# Patient Record
Sex: FEMALE | Race: WHITE | NOT HISPANIC OR LATINO | Employment: UNEMPLOYED | URBAN - METROPOLITAN AREA
[De-identification: names, ages, dates, MRNs, and addresses within clinical notes are randomized per-mention and may not be internally consistent; named-entity substitution may affect disease eponyms.]

---

## 2017-09-02 ENCOUNTER — OFFICE VISIT (OUTPATIENT)
Dept: URGENT CARE | Facility: CLINIC | Age: 32
End: 2017-09-02

## 2017-09-02 VITALS
HEIGHT: 62 IN | RESPIRATION RATE: 16 BRPM | SYSTOLIC BLOOD PRESSURE: 114 MMHG | WEIGHT: 114 LBS | BODY MASS INDEX: 20.98 KG/M2 | DIASTOLIC BLOOD PRESSURE: 75 MMHG | TEMPERATURE: 98 F | HEART RATE: 77 BPM | OXYGEN SATURATION: 100 %

## 2017-09-02 DIAGNOSIS — K20.90 ESOPHAGITIS: Primary | ICD-10-CM

## 2017-09-02 DIAGNOSIS — R07.9 CHEST PAIN, UNSPECIFIED TYPE: ICD-10-CM

## 2017-09-02 PROCEDURE — 3008F BODY MASS INDEX DOCD: CPT | Mod: S$GLB,,, | Performed by: EMERGENCY MEDICINE

## 2017-09-02 PROCEDURE — 99203 OFFICE O/P NEW LOW 30 MIN: CPT | Mod: S$GLB,,, | Performed by: EMERGENCY MEDICINE

## 2017-09-02 RX ORDER — SUCRALFATE 1 G/1
1 TABLET ORAL 4 TIMES DAILY
Qty: 40 TABLET | Refills: 0 | Status: SHIPPED | OUTPATIENT
Start: 2017-09-02 | End: 2018-09-02

## 2017-09-02 NOTE — PATIENT INSTRUCTIONS
Esophagitis     With esophagitis, the lining of the esophagus is inflamed.   Do you often have burning pain in your chest? You may have esophagitis. This is when the lining of the esophagus becomes red and swollen (inflamed). The esophagus is the tube that connects your throat to your stomach. This sheet tells you more about esophagitis. It also explains your treatment options.  Main types of esophagitis  Reflux esophagitis. This is the more common type. It is caused by GERD (gastroesophageal reflux disease). Stomach contents with stomach acid flow back up into the esophagus. This happens over and over. It leads to inflammation. Risk factors can include:  · Being overweight  · Asthma  · Smoking  · Pregnancy  · Frequent vomiting  · Certain medicines (such as aspirin and other anti-inflammatories)  · Hiatal hernia  Infectious esophagitis. This is caused by an infection. You are more at risk for this if you have a weakened immune system and poor nutrition. Antibiotic use can also be a factor. The infection is often due to the following:  · A type of fungus (typically candida)  · A virus, such as herpes simplex virus 1 (HSV-1) or cytomegalovirus (CMV)  Eosinophilic esophagitis. Foods or other things around you can give you an allergic reaction. This triggers an immune response and leads to esophagitis.  Pill-induced esophagitis. Certain types of medicines can cause inflammation and ulcers in the esophagus. These include doxycycline, aspirin, NSAIDs, alendronate, potassium, quinidine, iron.  Symptoms of esophagitis  The following symptoms can occur with esophagitis:  · Pain when swallowing, or trouble swallowing  · Pain behind your breastbone (heartburn)  · Acid regurgitation  · Chronic sore throat  · Gum Inflammation  · Cavities  · Bad breath  · Nausea  · Pain in your upper belly (abdomen)  · Bleeding (indicated by bright red vomit or black, tarry stool)  These symptoms occur more often with reflux  esophagitis:  · Coughing, wheezing, or asthma  · Hoarseness  Diagnosis of esophagitis  Your healthcare provider will ask about your health history and symptoms. Youll also be examined. Sometimes certain tests are needed. These may include:  · Upper endoscopy. A thin, flexible tube with a tiny light and camera is used. It is inserted through the mouth down into the esophagus. This lets the provider look for damage. A small sample of tissue (biopsy) may also be removed. The sample is sent to a lab for testing.  · Upper GI X-ray with barium. An X-ray is done after you drink a substance called barium. Barium may make problems in the esophagus easier to see on an x-ray.  · Esophageal pH. A soft, thin tube is passed into the esophagus through the nose or mouth for 24 hours. It measures the acid level in the esophagus.  · Esophageal manometry. A soft, thin tube is passed into the esophagus through the nose or mouth. It measures muscle contractions in the esophagus.  Treatment of esophagitis  Medicines. Different medicines can help treat esophagitis. The medicine used will depend on the type of esophagitis you have. Talk with your healthcare provider.  Lifestyle changes. Making the following changes can help reduce irritation and ease your symptoms:  · Avoid spicy foods (pepper, chili powder, de leon). Also avoid hard foods (nuts, crackers, raw vegetables) and acidic foods and drinks (tomatoes, citrus fruits and juices). Other problem foods include chocolate, peppermint, nutmeg, and foods high in fat.  · Until you can swallow without pain, follow a combined liquid and soft diet. Try foods such as cooked cereals, mashed potatoes, and soups.  · Take small bites and chew your food thoroughly.  · Avoid large meals and heavy evening meals. Don't lie down within 2 to 3 hours of eating.  · Get to or stay at a healthy weight.  · Avoid alcohol, caffeine, and smoking or tobacco products.  · Brush and floss your teeth  · Raise your  upper body by 4 to 6 inches when lying in bed. This can be done using a foam wedge. Or put blocks under the legs at the head of your bed.  Surgery. This may be needed for severe reflux esophagitis. Other noninvasive procedures to treat GERD and esophagitis are being studied. Your provider can tell you more.  Why treatment Is important  Without treatment, esophagitis can get worse. This is especially true with severe reflux esophagitis. For instance, continued symptoms can cause scarring of the esophagus. Over time, this can cause a narrowing the esophagus (stricture). This can make it hard to pass food down to the stomach. As symptoms go on they can also cause changes in the lining of the esophagus. These changes can put you at a slightly higher risk of cancer of the esophagus.   Date Last Reviewed: 7/1/2016 © 2000-2016 [employer]. [address] [place] [id number]. All rights reserved. This information is not intended as a substitute for professional medical care. Always follow your healthcare professional's instructions.        Uncertain Causes of Chest Pain    Chest pain can happen for a number of reasons. Sometimes the cause can't be determined. If your condition does not seem serious, and your pain does not appear to be coming from your heart, your healthcare provider may recommend watching it closely. Sometimes the signs of a serious problem take more time to appear. Many problems not related to your heart can cause chest pain.These include:  · Musculoskeletal. Costochondritis, an inflammation of the tissues around the ribs that can occur from trauma or overuse injuries  · Respiratory. Pneumonia, pneumothorax, or pneumonitis (inflammation of the lining of the chest and lungs)  · Gastrointestinal. Esophageal reflux, heartburn, or gallbladder disease  · Anxiety and panic disorders  · Nerve compression and neuritis  · Miscellaneous problems such as aortic aneurysm or pulmonary embolism (a  blood clot in the lungs)  Home care  After your visit, follow these recommendations:  · Rest today and avoid strenuous activity.  · Take any prescribed medicine as directed.  · Be aware of any recurrent chest pain and notice any changes  Follow-up care  Follow up with your healthcare provider if you do not start to feel better within 24 hours, or as advised.  Call 911  Call 911 if any of these occur:  · A change in the type of pain: if it feels different, becomes more severe, lasts longer, or begins to spread into your shoulder, arm, neck, jaw or back  · Shortness of breath or increased pain with breathing  · Weakness, dizziness, or fainting  · Rapid heart beat  · Crushing sensation in your chest  When to seek medical advice  Call your healthcare provider right away if any of the following occur:  · Cough with dark colored sputum (phlegm) or blood  · Fever of 100.4ºF (38ºC) or higher, or as directed by your healthcare provider  · Swelling, pain or redness in one leg  · Shortness of breath  Date Last Reviewed: 12/30/2015 © 2000-2016 [employer]. [address] [place] [id number]. All rights reserved. This information is not intended as a substitute for professional medical care. Always follow your healthcare professional's instructions.

## 2017-09-02 NOTE — PROGRESS NOTES
Subjective:       Patient ID: Nakia Jaime is a 32 y.o. female.    Chief Complaint: Abdominal Pain    Pt states epigastric pain x 6 days after taking Aleve without water. She felt the aleve got stuck when she took it without water. Then she drank 2 glasses of water and it went down some but still has irritated feeling after all this times. No choking. No SOB No vomiting She is able to eat and drink. No hx GERD      Abdominal Pain   This is a new problem. The current episode started in the past 7 days. The onset quality is sudden. The problem has been unchanged. The pain is located in the epigastric region. The pain is at a severity of 6/10. The pain is moderate. The quality of the pain is aching. The abdominal pain radiates to the left shoulder. Pertinent negatives include no constipation, diarrhea, dysuria, fever, hematochezia, melena, nausea or vomiting. Nothing aggravates the pain. The pain is relieved by nothing.     Review of Systems   Constitution: Negative for chills and fever.   Cardiovascular: Negative for chest pain.   Respiratory: Negative for shortness of breath.    Musculoskeletal: Negative for back pain.   Gastrointestinal: Negative for abdominal pain, constipation, diarrhea, hematochezia, melena, nausea and vomiting.   Genitourinary: Negative for dysuria.       Objective:      Physical Exam   Constitutional: She is oriented to person, place, and time. She appears well-developed and well-nourished. She appears distressed (mild discomfort).   HENT:   Head: Normocephalic and atraumatic.   Right Ear: External ear normal.   Left Ear: External ear normal.   Nose: Nose normal.   Mouth/Throat: Oropharynx is clear and moist.   Eyes: Conjunctivae and EOM are normal. Pupils are equal, round, and reactive to light.   Neck: Normal range of motion. Neck supple.   Cardiovascular: Normal rate, regular rhythm and normal heart sounds.    Pulmonary/Chest: Effort normal and breath sounds normal.   Abdominal: Soft.  Bowel sounds are normal.   Musculoskeletal: Normal range of motion.   Neurological: She is alert and oriented to person, place, and time.   Skin: Skin is warm and dry.     CXR neg radiologist confirm  Assessment:       1. Esophagitis    2. Chest pain, unspecified type        Plan:       Nakia was seen today for abdominal pain.    Diagnoses and all orders for this visit:    Esophagitis  -     Ambulatory referral to Internal Medicine    Chest pain, unspecified type  -     X-Ray Chest PA And Lateral; Future    Other orders  -     sucralfate (CARAFATE) 1 gram tablet; Take 1 tablet (1 g total) by mouth 4 (four) times daily.

## 2018-04-04 ENCOUNTER — OFFICE VISIT (OUTPATIENT)
Dept: URGENT CARE | Facility: CLINIC | Age: 33
End: 2018-04-04

## 2018-04-04 VITALS
DIASTOLIC BLOOD PRESSURE: 48 MMHG | TEMPERATURE: 99 F | WEIGHT: 109 LBS | BODY MASS INDEX: 20.06 KG/M2 | OXYGEN SATURATION: 100 % | HEART RATE: 87 BPM | HEIGHT: 62 IN | SYSTOLIC BLOOD PRESSURE: 109 MMHG | RESPIRATION RATE: 20 BRPM

## 2018-04-04 DIAGNOSIS — R53.83 FATIGUE, UNSPECIFIED TYPE: ICD-10-CM

## 2018-04-04 DIAGNOSIS — J02.9 ACUTE PHARYNGITIS, UNSPECIFIED ETIOLOGY: Primary | ICD-10-CM

## 2018-04-04 LAB
CTP QC/QA: YES
S PYO RRNA THROAT QL PROBE: NEGATIVE

## 2018-04-04 PROCEDURE — 99214 OFFICE O/P EST MOD 30 MIN: CPT | Mod: S$GLB,,, | Performed by: NURSE PRACTITIONER

## 2018-04-04 PROCEDURE — 87880 STREP A ASSAY W/OPTIC: CPT | Mod: QW,S$GLB,, | Performed by: NURSE PRACTITIONER

## 2018-04-04 NOTE — PROGRESS NOTES
"Subjective:       Patient ID: Nakia Jaime is a 32 y.o. female.    Vitals:  height is 5' 2" (1.575 m) and weight is 49.4 kg (109 lb). Her oral temperature is 98.5 °F (36.9 °C). Her blood pressure is 109/48 (abnormal) and her pulse is 87. Her respiration is 20 and oxygen saturation is 100%.     Chief Complaint: Sore Throat and Headache    The patient complains of worsening sore throat and fatigue since Monday.  Pts sig other dx with Step and started on antibiotics today.  The patient also complains of pain with swallowing and "my throat feeling scratchy." Denies N/V.  Pt is able to tolerate PO fluids.  Pt also noticed "white spots" on throat today.  Pt resp e/u. NAD     Per Dr. Maldonado. Pt known to me We discussed the plan Her partner did test positive for strept She tested neg. She is self pay but would prefer the culture and no antibiotics unless she needs it. She will be given pain meds. She really needs note for work and wanted to know if she was contagious    Throat is erythematous but no soft palate swelling and small tender anterior nodes      Sore Throat    This is a new problem. The current episode started in the past 7 days. The problem has been gradually worsening. The pain is worse on the left side. There has been no fever. The pain is at a severity of 7/10. The pain is moderate. Associated symptoms include coughing, headaches, neck pain, swollen glands and trouble swallowing. Pertinent negatives include no abdominal pain, congestion, ear pain, hoarse voice or shortness of breath. She has had exposure to strep. She has tried NSAIDs for the symptoms. The treatment provided mild relief.   Headache    This is a new problem. The current episode started yesterday. The problem occurs constantly. The problem has been unchanged. The pain is located in the temporal region. The pain does not radiate. The pain quality is similar to prior headaches. The quality of the pain is described as dull. The pain is at a " "severity of 4/10. Associated symptoms include coughing, neck pain, sinus pressure, a sore throat ("white spots") and swollen glands. Pertinent negatives include no abdominal pain, ear pain, eye redness, fever or nausea. Nothing aggravates the symptoms. She has tried nothing for the symptoms. The treatment provided no relief.     Review of Systems   Constitution: Negative for chills, fever and malaise/fatigue.   HENT: Positive for odynophagia, sinus pressure, sore throat ("white spots") and trouble swallowing. Negative for congestion, ear pain and hoarse voice.    Eyes: Negative for discharge and redness.   Cardiovascular: Negative for chest pain, dyspnea on exertion and leg swelling.   Respiratory: Positive for cough. Negative for shortness of breath, sputum production and wheezing.    Musculoskeletal: Positive for neck pain. Negative for myalgias.   Gastrointestinal: Negative for abdominal pain and nausea.   Neurological: Positive for headaches.         Objective:      Physical Exam   Constitutional: She is oriented to person, place, and time. She appears well-developed and well-nourished. She is cooperative.  Non-toxic appearance. She does not appear ill. No distress.   HENT:   Head: Normocephalic and atraumatic.   Right Ear: Hearing, tympanic membrane and external ear normal. There is tenderness (wax noted ).   Left Ear: Hearing, tympanic membrane and external ear normal. There is tenderness (wax noted).   Nose: Nose normal. No mucosal edema, rhinorrhea or nasal deformity. No epistaxis. Right sinus exhibits no maxillary sinus tenderness and no frontal sinus tenderness. Left sinus exhibits no maxillary sinus tenderness and no frontal sinus tenderness.   Mouth/Throat: Uvula is midline and mucous membranes are normal. No trismus in the jaw. Normal dentition. No uvula swelling. Oropharyngeal exudate, posterior oropharyngeal edema and posterior oropharyngeal erythema present. Tonsils are 1+ on the right. Tonsils are " 2+ on the left.   Eyes: Conjunctivae, EOM and lids are normal. Pupils are equal, round, and reactive to light. Right eye exhibits no discharge. Left eye exhibits no discharge. No scleral icterus.   Sclera clear bilat   Neck: Trachea normal, normal range of motion, full passive range of motion without pain and phonation normal. Neck supple.   Cardiovascular: Normal rate, regular rhythm, normal heart sounds, intact distal pulses and normal pulses.    Pulmonary/Chest: Effort normal and breath sounds normal. No respiratory distress.   Abdominal: Soft. Normal appearance and bowel sounds are normal. She exhibits no distension, no pulsatile midline mass and no mass. There is no tenderness.   Musculoskeletal: Normal range of motion. She exhibits no edema or deformity.   Lymphadenopathy:     She has no cervical adenopathy.   Neurological: She is alert and oriented to person, place, and time. She exhibits normal muscle tone. Coordination normal.   Skin: Skin is warm, dry and intact. She is not diaphoretic. No pallor.   Psychiatric: She has a normal mood and affect. Her speech is normal and behavior is normal. Judgment and thought content normal. Cognition and memory are normal.   Nursing note and vitals reviewed.      Assessment:       1. Acute pharyngitis, unspecified etiology    2. Fatigue, unspecified type        Plan:     Strept test done and is negative  Strept culture ordered  Magic mouthwash prescribed                                                          Pharyngitis   If your condition worsens or fails to improve we recommend that you receive another evaluation at the ER immediately or contact your PCP to discuss your concerns or return here. You must understand that you've received an urgent care treatment only and that you may be released before all your medical problems are known or treated. You the patient will arrange for followup care as instructed.   The majority of all sore throats or tonsillitis are viral  and antibiotics will not treat this.   If the strept culture was done and returns negative in 3-5 days and you are still having a sore throat, you may need to get a mono spot test done or repeated.   Tylenol or ibuprofen for pain may help as long as you are not allergic to these meds or have a medical condition such as stomach ulcers, liver or kidney disease or taking blood thinners etc that would   prevent you from using these medications.   Rest and fluids will help as well.   If you were prescribed antibiotics and are female and on BCP use additional methods to prevent pregnancy while on the antibiotics and for one cycle after

## 2018-04-04 NOTE — LETTER
April 4, 2018      [facility]  [address] [facility] [address]  Phone: [phone]  Fax: [phone]       Patient: Nakia Jaime   YOB: 1985  Date of Visit: 04/04/2018    To Whom It May Concern:    Hannah Jaime  was at [facility] on 04/04/2018. She has a pharygitis and should not return to work until Friday 4/6/18 or no fever for 24 hours. If you have any questions or concerns, or if I can be of further assistance, please do not hesitate to contact me.    Sincerely,    Rafaela Maldonado MD

## 2018-04-06 ENCOUNTER — TELEPHONE (OUTPATIENT)
Dept: URGENT CARE | Facility: CLINIC | Age: 33
End: 2018-04-06

## 2018-04-06 LAB — S PYO THROAT QL CULT: POSITIVE

## 2018-04-06 RX ORDER — AMOXICILLIN 500 MG/1
500 TABLET, FILM COATED ORAL EVERY 12 HOURS
Qty: 20 TABLET | Refills: 0 | Status: SHIPPED | OUTPATIENT
Start: 2018-04-06 | End: 2018-04-16

## 2018-04-06 NOTE — TELEPHONE ENCOUNTER
PT is informed of the results of her throat culture. She understands that course of treatment.  Pharmacy updated and she is informed that she will need to be started on anbiotics.She has no complaints at this time and actually states she is feelingbetter

## 2018-04-06 NOTE — TELEPHONE ENCOUNTER
Attempted to contact pt.  Pt was a no answer.  Unable to leave message due to voicemail box being full.

## 2018-06-06 ENCOUNTER — OFFICE VISIT (OUTPATIENT)
Dept: URGENT CARE | Facility: CLINIC | Age: 33
End: 2018-06-06

## 2018-06-06 VITALS
HEIGHT: 62 IN | RESPIRATION RATE: 17 BRPM | TEMPERATURE: 99 F | DIASTOLIC BLOOD PRESSURE: 79 MMHG | WEIGHT: 116 LBS | BODY MASS INDEX: 21.35 KG/M2 | HEART RATE: 72 BPM | OXYGEN SATURATION: 100 % | SYSTOLIC BLOOD PRESSURE: 123 MMHG

## 2018-06-06 DIAGNOSIS — B86 SCABIES: Primary | ICD-10-CM

## 2018-06-06 PROCEDURE — 99214 OFFICE O/P EST MOD 30 MIN: CPT | Mod: S$GLB,,, | Performed by: PHYSICIAN ASSISTANT

## 2018-06-06 RX ORDER — PERMETHRIN 50 MG/G
CREAM TOPICAL ONCE
Qty: 60 G | Refills: 1 | Status: SHIPPED | OUTPATIENT
Start: 2018-06-06 | End: 2018-06-06

## 2018-06-06 NOTE — PROGRESS NOTES
"Subjective:       Patient ID: Nakia Jaime is a 33 y.o. female.    Vitals:  height is 5' 2" (1.575 m) and weight is 52.6 kg (116 lb). Her tympanic temperature is 98.6 °F (37 °C). Her blood pressure is 123/79 and her pulse is 72. Her respiration is 17 and oxygen saturation is 100%.     Chief Complaint: Scabies    Pt has red bumps on abdomen for 3 or 4 days. Pt had previous exposure to scabies. Pt treated herself with teatree oil.      Rash   This is a recurrent problem. The current episode started in the past 7 days. The problem has been gradually worsening since onset. The affected locations include the abdomen, left ankle and left buttock. The rash is characterized by itchiness and redness. She was exposed to nothing. Pertinent negatives include no fever, joint pain, shortness of breath or sore throat. Treatments tried: tea tree oil. The treatment provided no relief.     Review of Systems   Constitution: Negative for chills and fever.   HENT: Negative for sore throat.    Respiratory: Negative for shortness of breath.    Skin: Positive for itching and rash.   Musculoskeletal: Negative for joint pain.       Objective:      Physical Exam   Constitutional: She is oriented to person, place, and time. Vital signs are normal. She appears well-developed and well-nourished. She does not appear ill. No distress.   HENT:   Head: Normocephalic and atraumatic.   Right Ear: External ear normal.   Left Ear: External ear normal.   Nose: Nose normal.   Eyes: Conjunctivae, EOM and lids are normal. Right eye exhibits no discharge. Left eye exhibits no discharge.   Neck: Normal range of motion. Neck supple.   Cardiovascular: Normal rate, regular rhythm and normal heart sounds.  Exam reveals no gallop and no friction rub.    No murmur heard.  Pulmonary/Chest: Effort normal and breath sounds normal. No respiratory distress. She has no wheezes. She has no rales.   Musculoskeletal: Normal range of motion.   Neurological: She is alert " and oriented to person, place, and time.   Skin: Skin is warm and dry. Rash noted. She is not diaphoretic.        Multiple excoriated papules on lower abdomen and on left buttock; papules on buttock are in linear fashion; no visible drainage; no swelling; no surrounding erythema   Psychiatric: She has a normal mood and affect. Her behavior is normal.   Nursing note and vitals reviewed.      Assessment:       1. Scabies        Plan:       Patient states she has had scabies in the past as well. Discussed treatment method with patient. She expressed verbal understanding and agreement with treatment plan.    Scabies  -     permethrin (ELIMITE) 5 % cream; Apply topically once.  Dispense: 60 g; Refill: 1      Patient Instructions   Apply cream to entire body, leave overnight, and wash off in the morning. See attached handout for directions to rid your house of scabies.     Please follow up with your primary care provider within 2-5 days if your signs and symptoms have not resolved or worsen.     If your condition worsens or fails to improve we recommend that you receive another evaluation at the emergency room immediately or contact your primary medical clinic to discuss your concerns.   You must understand that you have received an Urgent Care treatment only and that you may be released before all of your medical problems are known or treated. You, the patient, will arrange for follow up care as instructed.         Scabies  Scabies is a skin infection. It is caused by a tiny parasitic insect, or mite, that is too small to see directly. It can be seen under a microscope, but it is usually recognized only by the rash and symptoms it causes. This can make it hard to diagnose since the signs and symptoms can be similar to other diseases.  The scabies mite tunnels under the skin. It creates a small burrow, where it leaves its eggs. These eggs jennings and grow into adults. They then create new burrows over the next 1 to 2 weeks.  The mites die in about 4 to 6 weeks. The rash and itching are caused by an allergic reaction to the scabies saliva or feces.  Scabies is highly contagious. It is spread by direct skin contact. It is easily spread by close personal contact, sexual contact, or by sharing bed linens or clothing used by an infected person.  It may take 4 to 6 weeks for symptoms to appear after being exposed. Everyone living in the house with you, as well as your sexual partners, should be treated at the same time. After the first treatment, you will no longer be contagious. You may return to work, school or .  Home care  · Machine wash in hot water all sheets, towels, pillowcases, underwear, pajamas, and any other clothing you have worn lately. Use the hot cycle of a dryer or use a hot iron to sterilize.  · Seal anything that is hard to wash in a plastic trash bag for 4 days. This includes coats, jackets, blankets, and bedspreads. (The insects die after 3 days off the human body.)  Medicines  Scabicides  Medicines used to treat scabies are called scabicides. These are creams that kill the scabies mites. A prescription is needed. When using these medicines:  · Always follow instructions provided by your healthcare provider and pharmacist. Also follow the printed instructions that come with the medicine.  · Talk with your provider about precautions to take when using these medicines.  · Use the cream on your body when your skin is cool and dry. Dont use it after a hot shower or bath.  · Usually the cream is put on your whole body. This means from your chin all the way down to your toes. Scabies does not usually affect an adults head. So cream is not needed there. For children, discuss this with your childs provider.  · Leave the cream or lotion on for the recommended amount of time. This is usually 8 to 12 hours.  · Dont leave cream or lotion on your skin longer than directed. Dont use more than recommended.  · Clean clothes  should be worn after the treatment.  · If you wash your hands after using the cream, you will need to reapply the cream to your hands.  · If you are breastfeeding, wash off your nipples before feeding. Then reapply the cream after breastfeeding.  · For babies or infants, put mittens on their hands. This will stop them from licking the cream or lotion. It will also stop them from scratching themselves because of the itching.  Other medicines  · An oral medicine called ivermectin may be prescribed for severe cases. It may also be used if you cant apply creams.  · Itching may cause the most discomfort. If large areas of your skin are affected, over-the-counter antihistamines may be used to reduce itching. Or you may be given a prescription antihistamine. Some of these medicines may make you sleepy. They are best used at bedtime. Antihistamines that dont make you sleepy can be used during the day. Note: Dont use medicine that has diphenhydramine if you have glaucoma, or if you are a man who has trouble urinating due to an enlarged prostate.  · If you were given antibiotics due to a bacterial infection, take them until they are finished. It is important to finish the antibiotics even if the wound looks better. This is to make sure the infection has cleared.  Follow-up care  Follow up with your healthcare provider, or as advised. Call your provider if your symptoms dont improve after 1 week, or if new burrows or rashes appear.  When to seek medical advice  Call your healthcare provider right away if any of these occur:  · Yellow-brown crusts or drainage from the sores  · Other signs of infection, including increasing redness, swelling, pain, or pus  · Fever of 100.4°F (38ºC) or higher, or as directed by your provider  Date Last Reviewed: 8/1/2016  © 7648-3900 [employer]. [address] [place] [address], [place] [address]. All rights reserved. This information is not intended as a substitute for professional  medical care. Always follow your healthcare professional's instructions.

## 2018-06-07 NOTE — PATIENT INSTRUCTIONS
Apply cream to entire body, leave overnight, and wash off in the morning. See attached handout for directions to rid your house of scabies.     Please follow up with your primary care provider within 2-5 days if your signs and symptoms have not resolved or worsen.     If your condition worsens or fails to improve we recommend that you receive another evaluation at the emergency room immediately or contact your primary medical clinic to discuss your concerns.   You must understand that you have received an Urgent Care treatment only and that you may be released before all of your medical problems are known or treated. You, the patient, will arrange for follow up care as instructed.         Scabies  Scabies is a skin infection. It is caused by a tiny parasitic insect, or mite, that is too small to see directly. It can be seen under a microscope, but it is usually recognized only by the rash and symptoms it causes. This can make it hard to diagnose since the signs and symptoms can be similar to other diseases.  The scabies mite tunnels under the skin. It creates a small burrow, where it leaves its eggs. These eggs jennings and grow into adults. They then create new burrows over the next 1 to 2 weeks. The mites die in about 4 to 6 weeks. The rash and itching are caused by an allergic reaction to the scabies saliva or feces.  Scabies is highly contagious. It is spread by direct skin contact. It is easily spread by close personal contact, sexual contact, or by sharing bed linens or clothing used by an infected person.  It may take 4 to 6 weeks for symptoms to appear after being exposed. Everyone living in the house with you, as well as your sexual partners, should be treated at the same time. After the first treatment, you will no longer be contagious. You may return to work, school or .  Home care  · Machine wash in hot water all sheets, towels, pillowcases, underwear, pajamas, and any other clothing you have worn  lately. Use the hot cycle of a dryer or use a hot iron to sterilize.  · Seal anything that is hard to wash in a plastic trash bag for 4 days. This includes coats, jackets, blankets, and bedspreads. (The insects die after 3 days off the human body.)  Medicines  Scabicides  Medicines used to treat scabies are called scabicides. These are creams that kill the scabies mites. A prescription is needed. When using these medicines:  · Always follow instructions provided by your healthcare provider and pharmacist. Also follow the printed instructions that come with the medicine.  · Talk with your provider about precautions to take when using these medicines.  · Use the cream on your body when your skin is cool and dry. Dont use it after a hot shower or bath.  · Usually the cream is put on your whole body. This means from your chin all the way down to your toes. Scabies does not usually affect an adults head. So cream is not needed there. For children, discuss this with your childs provider.  · Leave the cream or lotion on for the recommended amount of time. This is usually 8 to 12 hours.  · Dont leave cream or lotion on your skin longer than directed. Dont use more than recommended.  · Clean clothes should be worn after the treatment.  · If you wash your hands after using the cream, you will need to reapply the cream to your hands.  · If you are breastfeeding, wash off your nipples before feeding. Then reapply the cream after breastfeeding.  · For babies or infants, put mittens on their hands. This will stop them from licking the cream or lotion. It will also stop them from scratching themselves because of the itching.  Other medicines  · An oral medicine called ivermectin may be prescribed for severe cases. It may also be used if you cant apply creams.  · Itching may cause the most discomfort. If large areas of your skin are affected, over-the-counter antihistamines may be used to reduce itching. Or you may be given a  prescription antihistamine. Some of these medicines may make you sleepy. They are best used at bedtime. Antihistamines that dont make you sleepy can be used during the day. Note: Dont use medicine that has diphenhydramine if you have glaucoma, or if you are a man who has trouble urinating due to an enlarged prostate.  · If you were given antibiotics due to a bacterial infection, take them until they are finished. It is important to finish the antibiotics even if the wound looks better. This is to make sure the infection has cleared.  Follow-up care  Follow up with your healthcare provider, or as advised. Call your provider if your symptoms dont improve after 1 week, or if new burrows or rashes appear.  When to seek medical advice  Call your healthcare provider right away if any of these occur:  · Yellow-brown crusts or drainage from the sores  · Other signs of infection, including increasing redness, swelling, pain, or pus  · Fever of 100.4°F (38ºC) or higher, or as directed by your provider  Date Last Reviewed: 8/1/2016 © 2000-2017 [employer]. [address] [place] [address]. All rights reserved. This information is not intended as a substitute for professional medical care. Always follow your healthcare professional's instructions.

## 2020-03-18 ENCOUNTER — TELEPHONE (OUTPATIENT)
Dept: ADMINISTRATIVE | Facility: HOSPITAL | Age: 35
End: 2020-03-18

## 2020-03-18 NOTE — TELEPHONE ENCOUNTER
The patient was phoned about scheduling a virtual visit and inform them of the expectations. I left a voice message for the patient.    Chelsey FORD LPN  Clinical Care Coordinator  Internal Medicine  [facility]  [phone]

## 2020-03-19 ENCOUNTER — LAB VISIT (OUTPATIENT)
Dept: LAB | Facility: OTHER | Age: 35
End: 2020-03-19
Attending: FAMILY MEDICINE
Payer: COMMERCIAL

## 2020-03-19 ENCOUNTER — OFFICE VISIT (OUTPATIENT)
Dept: INTERNAL MEDICINE | Facility: CLINIC | Age: 35
End: 2020-03-19
Attending: FAMILY MEDICINE
Payer: COMMERCIAL

## 2020-03-19 ENCOUNTER — PATIENT MESSAGE (OUTPATIENT)
Dept: INTERNAL MEDICINE | Facility: CLINIC | Age: 35
End: 2020-03-19

## 2020-03-19 VITALS
HEIGHT: 62 IN | WEIGHT: 149.06 LBS | SYSTOLIC BLOOD PRESSURE: 102 MMHG | OXYGEN SATURATION: 99 % | BODY MASS INDEX: 27.43 KG/M2 | HEART RATE: 74 BPM | DIASTOLIC BLOOD PRESSURE: 70 MMHG

## 2020-03-19 DIAGNOSIS — M25.571 CHRONIC PAIN OF RIGHT ANKLE: ICD-10-CM

## 2020-03-19 DIAGNOSIS — Z00.00 PREVENTATIVE HEALTH CARE: ICD-10-CM

## 2020-03-19 DIAGNOSIS — Z00.00 PREVENTATIVE HEALTH CARE: Primary | ICD-10-CM

## 2020-03-19 DIAGNOSIS — L72.0 EPIDERMAL INCLUSION CYST: ICD-10-CM

## 2020-03-19 DIAGNOSIS — G89.29 CHRONIC PAIN OF RIGHT ANKLE: ICD-10-CM

## 2020-03-19 LAB
ALBUMIN SERPL BCP-MCNC: 4.5 G/DL (ref 3.5–5.2)
ALP SERPL-CCNC: 59 U/L (ref 55–135)
ALT SERPL W/O P-5'-P-CCNC: 18 U/L (ref 10–44)
ANION GAP SERPL CALC-SCNC: 5 MMOL/L (ref 8–16)
AST SERPL-CCNC: 20 U/L (ref 10–40)
BILIRUB SERPL-MCNC: 0.4 MG/DL (ref 0.1–1)
BUN SERPL-MCNC: 11 MG/DL (ref 6–20)
CALCIUM SERPL-MCNC: 9.5 MG/DL (ref 8.7–10.5)
CHLORIDE SERPL-SCNC: 106 MMOL/L (ref 95–110)
CHOLEST SERPL-MCNC: 141 MG/DL (ref 120–199)
CHOLEST/HDLC SERPL: 2 {RATIO} (ref 2–5)
CO2 SERPL-SCNC: 28 MMOL/L (ref 23–29)
CREAT SERPL-MCNC: 0.9 MG/DL (ref 0.5–1.4)
EST. GFR  (AFRICAN AMERICAN): >60 ML/MIN/1.73 M^2
EST. GFR  (NON AFRICAN AMERICAN): >60 ML/MIN/1.73 M^2
ESTIMATED AVG GLUCOSE: 97 MG/DL (ref 68–131)
GLUCOSE SERPL-MCNC: 83 MG/DL (ref 70–110)
HBA1C MFR BLD HPLC: 5 % (ref 4–5.6)
HDLC SERPL-MCNC: 72 MG/DL (ref 40–75)
HDLC SERPL: 51.1 % (ref 20–50)
LDLC SERPL CALC-MCNC: 59.8 MG/DL (ref 63–159)
NONHDLC SERPL-MCNC: 69 MG/DL
POTASSIUM SERPL-SCNC: 3.6 MMOL/L (ref 3.5–5.1)
PROT SERPL-MCNC: 7.4 G/DL (ref 6–8.4)
SODIUM SERPL-SCNC: 139 MMOL/L (ref 136–145)
TRIGL SERPL-MCNC: 46 MG/DL (ref 30–150)
TSH SERPL DL<=0.005 MIU/L-ACNC: 1.21 UIU/ML (ref 0.4–4)

## 2020-03-19 PROCEDURE — 99385 PR PREVENTIVE VISIT,NEW,18-39: ICD-10-PCS | Mod: S$GLB,,, | Performed by: FAMILY MEDICINE

## 2020-03-19 PROCEDURE — 99999 PR PBB SHADOW E&M-EST. PATIENT-LVL IV: CPT | Mod: PBBFAC,,, | Performed by: FAMILY MEDICINE

## 2020-03-19 PROCEDURE — 36415 COLL VENOUS BLD VENIPUNCTURE: CPT

## 2020-03-19 PROCEDURE — 84443 ASSAY THYROID STIM HORMONE: CPT

## 2020-03-19 PROCEDURE — 83036 HEMOGLOBIN GLYCOSYLATED A1C: CPT

## 2020-03-19 PROCEDURE — 99385 PREV VISIT NEW AGE 18-39: CPT | Mod: S$GLB,,, | Performed by: FAMILY MEDICINE

## 2020-03-19 PROCEDURE — 80061 LIPID PANEL: CPT

## 2020-03-19 PROCEDURE — 99999 PR PBB SHADOW E&M-EST. PATIENT-LVL IV: ICD-10-PCS | Mod: PBBFAC,,, | Performed by: FAMILY MEDICINE

## 2020-03-19 PROCEDURE — 80053 COMPREHEN METABOLIC PANEL: CPT

## 2020-03-19 NOTE — PROGRESS NOTES
CHIEF COMPLAINT: Establish a primary care physician    HISTORY OF PRESENT ILLNESS: The patient is a perfectly healthy 34-year-old white female.  The patient has 2 specific complaints today.  It has been a while since the patient has seen a primary care physician.  The patient wishes to establish a primary care physician.  The patient would also like to get some basic blood work done.    Midline T12 epidermal inclusion cyst has been present for quite a long time.  Intermitently drains.  She would like to have it excised.    Right ankle sprain due to jumping out of a pickup truck bed at work several months ago.  Giving up on WC as she cannot get the care and therapy that she feels she needs.  Would like to procede with her BC/BS for further care    REVIEW OF SYSTEMS:  GENERAL: No fever, chills, fatigability or weight loss.  SKIN: No rashes, itching or changes in color or texture of skin.  Except for the dermal cyst  HEAD: No headaches or recent head trauma.  EYES: Visual acuity fine. No photophobia, ocular pain or diplopia.  EARS: Denies ear pain, discharge or vertigo.  NOSE: No loss of smell, no epistaxis or postnasal drip.  MOUTH & THROAT: No hoarseness or change in voice. No excessive gum bleeding.  NODES: Denies swollen glands.  CHEST: Denies FIGUEROA, cyanosis, wheezing, cough and sputum production.  CARDIOVASCULAR: Denies chest pain, PND, orthopnea or reduced exercise tolerance.  ABDOMEN: Appetite fine. No weight loss. Denies diarrhea, abdominal pain, hematemesis or blood in stool.  URINARY: No flank pain, dysuria or hematuria.  PERIPHERAL VASCULAR: No claudication or cyanosis.  MUSCULOSKELETAL:  She has right ankle joint stiffness + swelling. Denies back pain.  NEUROLOGIC: No history of seizures, paralysis, alteration of gait or coordination.    SOCIAL HISTORY: The patient does not smoke.  The patient consumes alcohol socially.  The patient is single.  She works at [employer]    PHYSICAL EXAMINATION:   Blood  "pressure 102/70, pulse 74, height 5' 2" (1.575 m), weight 67.6 kg (149 lb 0.5 oz), SpO2 99 %.    APPEARANCE: Well nourished, well developed, in no acute distress.    HEAD: Normocephalic, atraumatic.  EYES: PERRL. EOMI.  Conjunctivae without injection and  anicteric  EARS: TM's intact. Light reflex normal. No retraction or perforation.    NOSE: Mucosa pink. Airway clear.  MOUTH & THROAT: No tonsillar enlargement. No pharyngeal erythema or exudate. No stridor.  NECK: Supple.   NODES: No cervical, axillary or inguinal lymph node enlargement.  CHEST: Lungs clear to auscultation.  No retractions are noted.  No rales or rhonchi are present.  CARDIOVASCULAR: Normal S1, S2. No rubs, murmurs or gallops.  ABDOMEN: Bowel sounds normal. Not distended. Soft. No tenderness or masses.  No ascites is noted.  MUSCULOSKELETAL:  There is no clubbing, cyanosis, or edema of the extremities x4.  There is full range of motion of the lumbar spine.  There is full range of motion of the extremities x4.  There is no deformity noted.    NEUROLOGIC:       Normal speech development.      Hearing normal.      Normal gait.      Motor and sensory exams grossly normal.  PSYCHIATRIC: Patient is alert and oriented x3.  Thought processes are all normal.  There is no homicidality.  There is no suicidality.  There is no evidence of psychosis.    LABORATORY/RADIOLOGY:   Chart reviewed.  We will update blood work today.    ASSESSMENT:   Annual  Right ankle pain  Epidermal inclusion cyst    PLAN:  We will follow-up blood work which we expect to be normal.  Dermatology and orthopedic refills  Return to clinic in one year.      Answers for HPI/ROS submitted by the patient on 3/16/2020   activity change: Yes  unexpected weight change: Yes  neck pain: Yes  hearing loss: No  rhinorrhea: Yes  trouble swallowing: No  eye discharge: No  visual disturbance: No  chest tightness: No  wheezing: No  chest pain: No  palpitations: No  blood in stool: No  constipation: " No  vomiting: No  diarrhea: No  polydipsia: No  polyuria: No  difficulty urinating: No  hematuria: No  menstrual problem: Yes  dysuria: No  joint swelling: No  arthralgias: Yes  headaches: Yes  weakness: No  confusion: No  dysphoric mood: Yes

## 2020-03-20 ENCOUNTER — PATIENT MESSAGE (OUTPATIENT)
Dept: INTERNAL MEDICINE | Facility: CLINIC | Age: 35
End: 2020-03-20

## 2020-03-20 ENCOUNTER — TELEPHONE (OUTPATIENT)
Dept: SPORTS MEDICINE | Facility: CLINIC | Age: 35
End: 2020-03-20

## 2020-03-20 RX ORDER — VALACYCLOVIR HYDROCHLORIDE 500 MG/1
500 TABLET, FILM COATED ORAL 2 TIMES DAILY
Qty: 30 TABLET | Refills: 0 | Status: SHIPPED | OUTPATIENT
Start: 2020-03-20 | End: 2020-11-29 | Stop reason: SDUPTHER

## 2020-03-20 RX ORDER — VALACYCLOVIR HYDROCHLORIDE 500 MG/1
500 TABLET, FILM COATED ORAL 2 TIMES DAILY
COMMUNITY
End: 2020-03-20 | Stop reason: SDUPTHER

## 2020-03-23 ENCOUNTER — OFFICE VISIT (OUTPATIENT)
Dept: ORTHOPEDICS | Facility: CLINIC | Age: 35
End: 2020-03-23
Attending: FAMILY MEDICINE
Payer: COMMERCIAL

## 2020-03-23 ENCOUNTER — APPOINTMENT (OUTPATIENT)
Dept: RADIOLOGY | Facility: OTHER | Age: 35
End: 2020-03-23
Attending: NEUROMUSCULOSKELETAL MEDICINE & OMM
Payer: COMMERCIAL

## 2020-03-23 VITALS
WEIGHT: 149 LBS | SYSTOLIC BLOOD PRESSURE: 118 MMHG | BODY MASS INDEX: 27.42 KG/M2 | HEIGHT: 62 IN | DIASTOLIC BLOOD PRESSURE: 78 MMHG

## 2020-03-23 DIAGNOSIS — G89.29 CHRONIC PAIN OF RIGHT ANKLE: Primary | ICD-10-CM

## 2020-03-23 DIAGNOSIS — M25.371 ANKLE INSTABILITY, RIGHT: ICD-10-CM

## 2020-03-23 DIAGNOSIS — M72.2 PLANTAR FASCIITIS OF RIGHT FOOT: ICD-10-CM

## 2020-03-23 DIAGNOSIS — M25.571 CHRONIC PAIN OF RIGHT ANKLE: ICD-10-CM

## 2020-03-23 DIAGNOSIS — M99.06 SOMATIC DYSFUNCTION OF LOWER EXTREMITY: ICD-10-CM

## 2020-03-23 DIAGNOSIS — M99.05 SOMATIC DYSFUNCTION OF PELVIC REGION: ICD-10-CM

## 2020-03-23 DIAGNOSIS — M99.03 SOMATIC DYSFUNCTION OF LUMBAR REGION: ICD-10-CM

## 2020-03-23 DIAGNOSIS — G89.29 CHRONIC PAIN OF RIGHT ANKLE: ICD-10-CM

## 2020-03-23 DIAGNOSIS — M25.571 CHRONIC PAIN OF RIGHT ANKLE: Primary | ICD-10-CM

## 2020-03-23 DIAGNOSIS — M99.04 SACRAL REGION SOMATIC DYSFUNCTION: ICD-10-CM

## 2020-03-23 DIAGNOSIS — M79.10 MYALGIA: ICD-10-CM

## 2020-03-23 PROCEDURE — 97110 THERAPEUTIC EXERCISES: CPT | Mod: S$GLB,,, | Performed by: NEUROMUSCULOSKELETAL MEDICINE & OMM

## 2020-03-23 PROCEDURE — 98926 PR OSTEOPATHIC MANIP,3-4 BODY REGN: ICD-10-PCS | Mod: S$GLB,,, | Performed by: NEUROMUSCULOSKELETAL MEDICINE & OMM

## 2020-03-23 PROCEDURE — 99204 OFFICE O/P NEW MOD 45 MIN: CPT | Mod: 25,S$GLB,, | Performed by: NEUROMUSCULOSKELETAL MEDICINE & OMM

## 2020-03-23 PROCEDURE — 99999 PR PBB SHADOW E&M-EST. PATIENT-LVL III: ICD-10-PCS | Mod: PBBFAC,,, | Performed by: NEUROMUSCULOSKELETAL MEDICINE & OMM

## 2020-03-23 PROCEDURE — 98926 OSTEOPATH MANJ 3-4 REGIONS: CPT | Mod: S$GLB,,, | Performed by: NEUROMUSCULOSKELETAL MEDICINE & OMM

## 2020-03-23 PROCEDURE — 73610 X-RAY EXAM OF ANKLE: CPT | Mod: TC,RT

## 2020-03-23 PROCEDURE — 97110 PR THERAPEUTIC EXERCISES: ICD-10-PCS | Mod: S$GLB,,, | Performed by: NEUROMUSCULOSKELETAL MEDICINE & OMM

## 2020-03-23 PROCEDURE — 73610 X-RAY EXAM OF ANKLE: CPT | Mod: 26,RT,, | Performed by: RADIOLOGY

## 2020-03-23 PROCEDURE — 3008F PR BODY MASS INDEX (BMI) DOCUMENTED: ICD-10-PCS | Mod: CPTII,S$GLB,, | Performed by: NEUROMUSCULOSKELETAL MEDICINE & OMM

## 2020-03-23 PROCEDURE — 73610 XR ANKLE COMPLETE 3 VIEW RIGHT: ICD-10-PCS | Mod: 26,RT,, | Performed by: RADIOLOGY

## 2020-03-23 PROCEDURE — 99204 PR OFFICE/OUTPT VISIT, NEW, LEVL IV, 45-59 MIN: ICD-10-PCS | Mod: 25,S$GLB,, | Performed by: NEUROMUSCULOSKELETAL MEDICINE & OMM

## 2020-03-23 PROCEDURE — 3008F BODY MASS INDEX DOCD: CPT | Mod: CPTII,S$GLB,, | Performed by: NEUROMUSCULOSKELETAL MEDICINE & OMM

## 2020-03-23 PROCEDURE — 99999 PR PBB SHADOW E&M-EST. PATIENT-LVL III: CPT | Mod: PBBFAC,,, | Performed by: NEUROMUSCULOSKELETAL MEDICINE & OMM

## 2020-03-23 NOTE — PROGRESS NOTES
Subjective:     Nakia Jaime     Chief Complaint   Patient presents with    Right Foot - Pain    Right Ankle - Pain       HPI    Nakia is a 34 y.o. female coming in today for right ankle and foot pain that began about 7 month(s) ago, referred by self. Pt. describes the pain as a 5/10 achy pain that does not radiate. There was a fall/injury/ or trauma associated with the onset of symptoms. Pt jumped out of a a pickup truck at work and rolled her ankle. Initially seen through work comp with no relief following physical therapy. XR were negative and work comp denied an MRI. Pt states this is no longer being covered under work comp. Pt has taken tramadol, meloxicam, gabapentin, and flexeril, as well as some topical treatments. Pt reports her pain is better with rest, getting off feet and worse with mornings, walking, first steps after sitting for a while. Pt. Denies any other musculoskeletal complaints at this time.     Joint instability? no  Mechanical locking/clicking? + snapping and popping when she walks  Affecting ADL's? yes  Affecting sleep? 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   HENT: Negative for hearing loss and tinnitus.    Eyes: Negative for blurred vision and photophobia.   Respiratory: Positive for cough (3 days ago). Negative for shortness of breath.    Cardiovascular: Negative for chest pain and leg swelling.   Gastrointestinal: Negative for abdominal pain, heartburn, nausea and vomiting.   Genitourinary: Negative for dysuria and hematuria.   Musculoskeletal: Positive for joint pain. Negative for back pain, falls, myalgias and neck pain.   Skin: Negative for rash.   Neurological: Positive for headaches. Negative for dizziness, tingling, focal weakness and weakness.   Endo/Heme/Allergies: Negative for environmental allergies. Does not bruise/bleed easily.   Psychiatric/Behavioral: Negative for depression. The patient is not  nervous/anxious.        PAST MEDICAL HISTORY:   Past Medical History:   Diagnosis Date    COPD (chronic obstructive pulmonary disease)      PAST SURGICAL HISTORY:   Past Surgical History:   Procedure Laterality Date    LUNG BIOPSY      drained lungs of fluid     FAMILY HISTORY:   Family History   Problem Relation Age of Onset    Hypertension Father     Diabetes Father     No Known Problems Mother      SOCIAL HISTORY:   Social History     Socioeconomic History    Marital status: Single     Spouse name: Not on file    Number of children: Not on file    Years of education: Not on file    Highest education level: Not on file   Occupational History    Not on file   Social Needs    Financial resource strain: Not hard at all    Food insecurity:     Worry: Never true     Inability: Never true    Transportation needs:     Medical: Patient refused     Non-medical: No   Tobacco Use    Smoking status: Never Smoker    Smokeless tobacco: Never Used   Substance and Sexual Activity    Alcohol use: Yes     Frequency: 2-4 times a month     Drinks per session: 1 or 2     Binge frequency: Less than monthly     Comment: socially    Drug use: No    Sexual activity: Yes   Lifestyle    Physical activity:     Days per week: 5 days     Minutes per session: 60 min    Stress: To some extent   Relationships    Social connections:     Talks on phone: Twice a week     Gets together: Never     Attends Jew service: Not on file     Active member of club or organization: No     Attends meetings of clubs or organizations: Never     Relationship status: Living with partner   Other Topics Concern    Not on file   Social History Narrative    Not on file       MEDICATIONS:   Current Outpatient Medications:     valACYclovir (VALTREX) 500 MG tablet, Take 1 tablet (500 mg total) by mouth 2 (two) times daily., Disp: 30 tablet, Rfl: 0    (Magic mouthwash) 1:1:1 Benadryl 12.5mg/5ml liq, aluminum & magnesium hydroxide-simehticone  "(Maalox), lidocaine viscous 2%, 10 cc swish and spit every 4 hours as needed for mouth sores or sore throat, Disp: 90 mL, Rfl: 0  ALLERGIES: Review of patient's allergies indicates:  No Known Allergies    Objective:     VITAL SIGNS: /78   Ht 5' 2" (1.575 m)   Wt 67.6 kg (149 lb)   BMI 27.25 kg/m²    General    Nursing note and vitals reviewed.  Constitutional: She is oriented to person, place, and time. She appears well-developed and well-nourished.   HENT:   Head: Normocephalic and atraumatic.   no nasal discharge, no external ear redness or discharge   Eyes:   EOM is full and smooth  No eye redness or discharge   Neck: Neck supple. No tracheal deviation present.   Cardiovascular: Normal rate.    2+ Radial pulse bilaterally  2+ Dorsalis Pedis pulse bilaterally  No LE edema appreciated   Pulmonary/Chest: Effort normal. No respiratory distress.   Abdominal: She exhibits no distension.   No rigidity   Neurological: She is alert and oriented to person, place, and time. She exhibits normal muscle tone. Coordination normal.   See details below   Psychiatric: She has a normal mood and affect. Her behavior is normal.               MUSCULOSKELETAL EXAM  ANKLE: right ANKLE  The affected ankle is compared to the contralateral ankle.    Observation:    There is mild lateral ankle edema - improved following OMT. No erythema, or ecchymosis.   Shoes reveal a normal wear pattern.  No structural deformities including pes planus/cavus, hallux valgus, or toe deformities.  Negative too-many toes sign.    Normal callus pattern on the feet bilaterally.    Achilles tendon and calcaneal insertion reveals no deformities  No leg or intrinsic foot muscle atrophy.  Squatting reveals symmetric pronation of the bilateral feet.   Able to rise on toes with symmetric supination, but pain at right lateral ankle (improved following OMT)   Slight right antalgic gait  Right ankle instability with single leg raise    ROM (* = with " pain):  Active dorsiflexion to 20° on left and 20° on right  Active plantarflexion to 50° on left and 50° on right    Active ankle inversion to 35° on left and 35° on right  Active ankle eversion to 15° on left and 15° on right  Full active flexion/extension of the toes bilaterally.   + right Heel cords tightness    Tenderness To Palpation:  No tenderness at the ATFL, CFL, PTFL, or deltoid ligaments  No tenderness over the distal anterior syndesmosis, distal tibia/fibula, fibular head/shaft  No tenderness at medial or lateral malleoli   No tenderness at navicular, cuboid, cuneiforms, or talus  + tenderness at the calcaneous  No tenderness along the metatarsals or phalanges  No tenderness at the Achilles tendon calcaneal insertion  No tenderness at the posterior tibial   + tenderness at the peroneal tendons on right  + tenderness at the plantar fascia     Strength Testing (* = with pain):  Dorsiflexion - 5/5 on left and 5/5 on right  Platarflexion - 5/5 on left and 5/5 on right  Resisted Inversion - 5/5 on left and 5/5 on right  Resisted Eversion - 5/5 on left and 5/5 on right* (right lateral ankle pain) - improved following OMT  Great Toe Extension - 5/5 on left and 5/5 on right  Great Toe Flexion - 5/5 on left and 5/5 on right    Special Tests:  Anterior talar drawer - negative and without dimpling  Talar tilt - negative  Reverse Talar tilt - negative    Heel tap test - negative  Distal tib/fib squeeze test - negative  External rotation stress (Kleiger) test - negative  Bonilla squeeze test - negative    Metatarsal squeeze test - negative  Midfoot stress test - negative  Calcaneal squeeze test - negative    No subluxation of the peroneal tendons with resisted eversion.    Vascular/Sensory Exam:  DP and PT pulses intact bilaterally  No skin changes, no abnormal hair distribution  Sensation intact to light touch throughout the saphenous, sural, superficial peroneal, deep peroneal, and tibial nerve  distributions  Negative Tinel's test over tarsal tunnel  2+/4 reflexes at L4 and S1 dermatomes  Capillary refill intact <2 seconds in all toes bilaterally.    TART (Tissue texture abnormality, Asymmetry,  Restriction of motion and/or Tenderness) changes:     Lumbar Spine   L1 Neutral   L2 Neutral   L3 Neutral   L4 FRS RIGHT   L5 FRS RIGHT       Pelvis:  · Innominate:Right anterior rotation  · Pubic bone:Right inferior pubic shear    Sacrum:Left on Right sacral torsion    Lower Extremity:  · Leg lengths symmetric    Location/joint Finding/restriction   Fibular head Neutral   Tibia External torsion on right   Talocrural joint Bilateral, right continual distortion (CD) fascial distortion    Subtalar Joint Neutral   Cuboid Neutral   Talo-navicular joint Right   Navicular-cuneiform joint Right   3rd, 4th Cuneiform-metatarsal joint Right   3rd, 4th metatarsal Right   1st, 2nd, 3rd, 4th, 5th phalange Neutral   Right lateral ankle Herniated trigger point (HTP) fascial distortion and Trigger band (TB) fascial distortion   Right medial ankle Trigger band (TB) fascial distortion    Right lateral calcaneous Herniated trigger point (HTP) fascial distortion        Key   F= Flexed   E = Extended   R = Rotated   S = Sidebent   TTA = tissue texture abnormality     IMAGIN. X-ray ordered due to right ankle pain. (AP, lateral, and oblique views) taken today.   2. X-ray images were reviewed personally by me and then directly with patient.  3. FINDINGS: X-ray images obtained demonstrate No fracture or dislocation.  No bone destruction identified  4. IMPRESSION: No pathology or irregularities appreciated.       Assessment:      Encounter Diagnoses   Name Primary?    Chronic pain of right ankle Yes    Ankle instability, right     Plantar fasciitis of right foot     Myalgia     Somatic dysfunction of lumbar region     Sacral region somatic dysfunction     Somatic dysfunction of pelvic region     Somatic dysfunction of lower  extremity           Plan:   1. Chronic right ankle pain from previous ankle sprain on 7 months ago resulting in ankle instability and associated biomechanical restrictions of the lower kinetic chain. Underlying right plantar fasciitis also noted on physical exam.   - OMT performed today to address associated biomechanical restrictions  and HEP started.   - Recommend Ice up to 20 minutes at a time (as well as ice rolling to the plantar fascia) and OTC NSAIDs prn for pain control  - Recommend right ankle brace wear prn for work activity for increased stability  - Recommend continued use of supportive, firm soled shoe  - Consider referral to formal PT if pain does not improve, however not currently possible due to COVID-19 restrictions.   -  X-ray images of right ankle taken today (AP, lateral, and oblique views) showed no abnormalities. Images were personally reviewed with patient.    2. OMT 3-4 regions. Oral consent obtained.  Reviewed benefits and potential side effects.   - OMT indicated today due to signs and symptoms as well as local and remote somatic dysfunction findings and their related neurokinetic, lymphatic, fascial and/or arteriovenous body connections.   - OMT techniques used: Myofascial Release, Muscle Energy, Fascial Distortion Model and Articulatory   - Treatment was tolerated well. Improvement noted in segmental mobility post-treatment in dysfunctional regions. There were no adverse events and no complications immediately following treatment.     3. Pt. Given the following HEP:  A) Ankle proprioception exercises: balance on  ankle on single leg with eyes open, then progressing to balancing while eyes closed, and then on uneven surface (throw pillow or balance ball)  - hold each position for 30 seconds-1 mintue. Repeat 2-3 times a day for each ankle.   B) Ankle range of motion exercises: Spell out the alphabet with your ankle while seated, perform exercises 1-2 times daily.  C) Eccentric calf  stretches:  Lower heels off of a step slowly until reaching end rand plantarflexion, repeating 10 reps twice daily.  Handout given  D)  Bilateral Calf stretching with knee flexed and extended: hold stretch for 30 seconds, repeating 2-3 times on each side. Do stretch twice daily    [address]38 HOME EXERCISE PROGRAM (HEP):  The patient was taught a homegoing physical therapy regimen as described above. The patient demonstrated understanding of the exercises and proper technique of their execution. This interaction took 15 minutes.     4. Follow-up in 4 weeks for reevaluation via virtual visit    5. Patient agreeable to today's plan and all questions were answered    This note is dictated using the M*Modal Fluency Direct word recognition program. There are word recognition mistakes that are occasionally missed on review.

## 2020-04-09 ENCOUNTER — PATIENT MESSAGE (OUTPATIENT)
Dept: SPORTS MEDICINE | Facility: CLINIC | Age: 35
End: 2020-04-09

## 2020-04-20 ENCOUNTER — PATIENT MESSAGE (OUTPATIENT)
Dept: SPORTS MEDICINE | Facility: CLINIC | Age: 35
End: 2020-04-20

## 2020-04-20 ENCOUNTER — OFFICE VISIT (OUTPATIENT)
Dept: SPORTS MEDICINE | Facility: CLINIC | Age: 35
End: 2020-04-20
Payer: COMMERCIAL

## 2020-04-20 DIAGNOSIS — M25.571 CHRONIC PAIN OF RIGHT ANKLE: Primary | ICD-10-CM

## 2020-04-20 DIAGNOSIS — M25.371 RIGHT ANKLE INSTABILITY: ICD-10-CM

## 2020-04-20 DIAGNOSIS — G89.29 CHRONIC PAIN OF RIGHT ANKLE: Primary | ICD-10-CM

## 2020-04-20 DIAGNOSIS — M72.2 PLANTAR FASCIITIS, RIGHT: ICD-10-CM

## 2020-04-20 PROCEDURE — 99213 PR OFFICE/OUTPT VISIT, EST, LEVL III, 20-29 MIN: ICD-10-PCS | Mod: 95,,, | Performed by: NEUROMUSCULOSKELETAL MEDICINE & OMM

## 2020-04-20 PROCEDURE — 99213 OFFICE O/P EST LOW 20 MIN: CPT | Mod: 95,,, | Performed by: NEUROMUSCULOSKELETAL MEDICINE & OMM

## 2020-04-20 NOTE — PROGRESS NOTES
Subjective:     Nakia Jaime     Chief Complaint   Patient presents with    Right Ankle - Pain     The patient location is: home, safe  The chief complaint leading to consultation is: right foot and ankle pain  Visit type: audiovisual  Total time spent with patient: 10 minutes  Each patient to whom he or she provides medical services by telemedicine is:  (1) informed of the relationship between the physician and patient and the respective role of any other health care provider with respect to management of the patient; and (2) notified that he or she may decline to receive medical services by telemedicine and may withdraw from such care at any time.    Notes:       RUDOLPH Yee is a 34 y.o. female is being seen today for a follow-up of her right ankle and foot pain that began about 8 month(s) ago (pleae see note from 3/23/2020 for full injury details). Since last visit pt. Notes an improvement in right ankle pain, noting that the pain is less severe and her ankle feels more stable.  She also notes overall less clicking of her right ankle.  Patient has been doing her ankle range of motion exercises regularly, but admits to not doing the calf stretches as much since her right foot pain has improved.  She still notes some discomfort at the right lateral ankle at times, but she has not been taking any over-the-counter NSAIDs or other pain medication for this.   Pt. describes the pain as a 3/10 achy pain that does not radiate.     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   Respiratory: Negative for cough and shortness of breath.    Musculoskeletal: Positive for joint pain. Negative for back pain, falls, myalgias and neck pain.   Neurological: Negative for dizziness, tingling, focal weakness, weakness and headaches.       PAST MEDICAL HISTORY:   Past Medical History:   Diagnosis Date    COPD (chronic obstructive pulmonary disease)      PAST SURGICAL  HISTORY:   Past Surgical History:   Procedure Laterality Date    LUNG BIOPSY      drained lungs of fluid     FAMILY HISTORY:   Family History   Problem Relation Age of Onset    Hypertension Father     Diabetes Father     No Known Problems Mother      SOCIAL HISTORY:   Social History     Socioeconomic History    Marital status: Single     Spouse name: Not on file    Number of children: Not on file    Years of education: Not on file    Highest education level: Not on file   Occupational History    Not on file   Social Needs    Financial resource strain: Not hard at all    Food insecurity:     Worry: Never true     Inability: Never true    Transportation needs:     Medical: Patient refused     Non-medical: No   Tobacco Use    Smoking status: Never Smoker    Smokeless tobacco: Never Used   Substance and Sexual Activity    Alcohol use: Yes     Frequency: 2-4 times a month     Drinks per session: 1 or 2     Binge frequency: Less than monthly     Comment: socially    Drug use: No    Sexual activity: Yes   Lifestyle    Physical activity:     Days per week: 5 days     Minutes per session: 60 min    Stress: To some extent   Relationships    Social connections:     Talks on phone: Twice a week     Gets together: Never     Attends Yarsanism service: Not on file     Active member of club or organization: No     Attends meetings of clubs or organizations: Never     Relationship status: Living with partner   Other Topics Concern    Not on file   Social History Narrative    Not on file       MEDICATIONS:   Current Outpatient Medications:     (Magic mouthwash) 1:1:1 Benadryl 12.5mg/5ml liq, aluminum & magnesium hydroxide-simehticone (Maalox), lidocaine viscous 2%, 10 cc swish and spit every 4 hours as needed for mouth sores or sore throat, Disp: 90 mL, Rfl: 0    valACYclovir (VALTREX) 500 MG tablet, Take 1 tablet (500 mg total) by mouth 2 (two) times daily., Disp: 30 tablet, Rfl: 0  ALLERGIES: Review of  patient's allergies indicates:  No Known Allergies    Objective:     VITAL SIGNS: There were no vitals taken for this visit.   General    Vitals reviewed.  Constitutional: She is oriented to person, place, and time. She appears well-developed and well-nourished.   Neurological: She is alert and oriented to person, place, and time.   Psychiatric: She has a normal mood and affect. Her behavior is normal.               MUSCULOSKELETAL EXAM  ANKLE: right ANKLE  The affected ankle is compared to the contralateral ankle.    Observation:    No significant lateral ankle edema appreciated. No erythema, or ecchymosis.   Squatting reveals symmetric pronation of the bilateral feet.   Able to rise on toes with symmetric supination, but pain at right lateral ankle with motion  Right ankle instability improved with single leg raise    ROM (* = with pain):  Active dorsiflexion to 20° on left and 20° on right  Active plantarflexion to 50° on left and 50° on right    Active ankle inversion to 35° on left and 35° on right  Active ankle eversion to 15° on left and 15° on right  Full active flexion/extension of the toes bilaterally.   Heel cords tightness unable to assess on virtual visit    Tenderness To Palpation:  Unable to assess on virtual visit  + subjective tenderness at the peroneal tendons on right  No subjective tenderness at the plantar surface of the foot or at the calcaneous    Strength Testing (* = with pain): Unable to assess on virtual visit  .  Vascular/Sensory Exam:  No skin changes, no abnormal hair distribution      Assessment:      Encounter Diagnoses   Name Primary?    Chronic pain of right ankle Yes    Right ankle instability           Plan:   1. Chronic right ankle pain from previous ankle sprain about 8 months ago resulting in ankle instability and associated biomechanical restrictions of the lower kinetic chain - improved since last visit.  Underlying right plantar fasciitis noted previously has also  improved.    - recommend in person follow-up for further evaluation and possible OMT to address residual biomechanical restrictions   - continue Ice up to 20 minutes at a time prn for pain control  - continue right ankle brace wear prn for work activity for increased stability  - Recommend continued use of supportive, firm soled shoe  -  X-ray images of right ankle taken 3/23/2020 (AP, lateral, and oblique views) showed no abnormalities.     2. Reviewed with pt.  the following HEP:  Continue daily:  A) Ankle proprioception exercises: balance on  ankle on single leg with eyes open, then progressing to balancing while eyes closed, and then on uneven surface (throw pillow or balance ball)  - hold each position for 30 seconds-1 mintue. Repeat 2-3 times a day for each ankle.   B) Ankle range of motion exercises: Spell out the alphabet with your ankle while seated, perform exercises 1-2 times daily.  Continue prn:  C) Eccentric calf stretches:  Lower heels off of a step slowly until reaching end rand plantarflexion, repeating 10 reps twice daily.  Handout given  D)  Bilateral Calf stretching with knee flexed and extended: hold stretch for 30 seconds, repeating 2-3 times on each side. Do stretch twice daily     The patient was taught a homegoing physical therapy regimen as described above. The patient demonstrated understanding of the exercises and proper technique of their execution.      3. Follow-up in 2-3 weeks for reevaluation in clinic    4. Patient agreeable to today's plan and all questions were answered    This note is dictated using the M*Modal Fluency Direct word recognition program. There are word recognition mistakes that are occasionally missed on review.

## 2020-05-04 ENCOUNTER — PATIENT MESSAGE (OUTPATIENT)
Dept: SPORTS MEDICINE | Facility: CLINIC | Age: 35
End: 2020-05-04

## 2020-05-11 ENCOUNTER — OFFICE VISIT (OUTPATIENT)
Dept: ORTHOPEDICS | Facility: CLINIC | Age: 35
End: 2020-05-11
Payer: COMMERCIAL

## 2020-05-11 VITALS
WEIGHT: 149 LBS | BODY MASS INDEX: 27.42 KG/M2 | DIASTOLIC BLOOD PRESSURE: 76 MMHG | HEIGHT: 62 IN | SYSTOLIC BLOOD PRESSURE: 104 MMHG

## 2020-05-11 DIAGNOSIS — M25.571 CHRONIC PAIN OF RIGHT ANKLE: Primary | ICD-10-CM

## 2020-05-11 DIAGNOSIS — G89.29 CHRONIC PAIN OF RIGHT ANKLE: Primary | ICD-10-CM

## 2020-05-11 DIAGNOSIS — M99.05 SOMATIC DYSFUNCTION OF PELVIC REGION: ICD-10-CM

## 2020-05-11 DIAGNOSIS — M79.10 MYALGIA: ICD-10-CM

## 2020-05-11 DIAGNOSIS — M25.371 ANKLE INSTABILITY, RIGHT: ICD-10-CM

## 2020-05-11 DIAGNOSIS — M99.03 SOMATIC DYSFUNCTION OF LUMBAR REGION: ICD-10-CM

## 2020-05-11 DIAGNOSIS — M99.06 SOMATIC DYSFUNCTION OF LOWER EXTREMITY: ICD-10-CM

## 2020-05-11 PROCEDURE — 98926 OSTEOPATH MANJ 3-4 REGIONS: CPT | Mod: S$GLB,,, | Performed by: NEUROMUSCULOSKELETAL MEDICINE & OMM

## 2020-05-11 PROCEDURE — 99999 PR PBB SHADOW E&M-EST. PATIENT-LVL III: ICD-10-PCS | Mod: PBBFAC,,, | Performed by: NEUROMUSCULOSKELETAL MEDICINE & OMM

## 2020-05-11 PROCEDURE — 3008F PR BODY MASS INDEX (BMI) DOCUMENTED: ICD-10-PCS | Mod: CPTII,S$GLB,, | Performed by: NEUROMUSCULOSKELETAL MEDICINE & OMM

## 2020-05-11 PROCEDURE — 99213 OFFICE O/P EST LOW 20 MIN: CPT | Mod: 25,S$GLB,, | Performed by: NEUROMUSCULOSKELETAL MEDICINE & OMM

## 2020-05-11 PROCEDURE — 3008F BODY MASS INDEX DOCD: CPT | Mod: CPTII,S$GLB,, | Performed by: NEUROMUSCULOSKELETAL MEDICINE & OMM

## 2020-05-11 PROCEDURE — 98926 PR OSTEOPATHIC MANIP,3-4 BODY REGN: ICD-10-PCS | Mod: S$GLB,,, | Performed by: NEUROMUSCULOSKELETAL MEDICINE & OMM

## 2020-05-11 PROCEDURE — 99999 PR PBB SHADOW E&M-EST. PATIENT-LVL III: CPT | Mod: PBBFAC,,, | Performed by: NEUROMUSCULOSKELETAL MEDICINE & OMM

## 2020-05-11 PROCEDURE — 99213 PR OFFICE/OUTPT VISIT, EST, LEVL III, 20-29 MIN: ICD-10-PCS | Mod: 25,S$GLB,, | Performed by: NEUROMUSCULOSKELETAL MEDICINE & OMM

## 2020-05-11 NOTE — PROGRESS NOTES
Subjective:     Nakia Jaime     Chief Complaint   Patient presents with    Follow-up     R ankle       Follow-up   Pertinent negatives include no chills, coughing, fever, headaches, myalgias, neck pain or weakness.       Nakia is a 35 y.o. female is being seen today for a follow-up of her right ankle and foot pain that began about 8 month(s) ago (pleae see note from 3/23/2020 for full injury details). Since last visit pt. Notes an improvement in right ankle pain, noting that the pain is less severe and her ankle feels more stable. She was able to do some light running on her ankle as well without any significant pain.  She does note popping in the medial ankle over the past few days. Pt. describes the pain as a 2/10 achy pain that does not radiate.     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   Respiratory: Negative for cough and shortness of breath.    Musculoskeletal: Positive for joint pain. Negative for back pain, falls, myalgias and neck pain.   Neurological: Negative for dizziness, tingling, focal weakness, weakness and headaches.     PAST MEDICAL HISTORY:   Past Medical History:   Diagnosis Date    COPD (chronic obstructive pulmonary disease)      PAST SURGICAL HISTORY:   Past Surgical History:   Procedure Laterality Date    LUNG BIOPSY      drained lungs of fluid     FAMILY HISTORY:   Family History   Problem Relation Age of Onset    Hypertension Father     Diabetes Father     No Known Problems Mother      SOCIAL HISTORY:   Social History     Socioeconomic History    Marital status: Single     Spouse name: Not on file    Number of children: Not on file    Years of education: Not on file    Highest education level: Not on file   Occupational History    Not on file   Social Needs    Financial resource strain: Not hard at all    Food insecurity:     Worry: Never true     Inability: Never true    Transportation needs:     Medical:  "Patient refused     Non-medical: No   Tobacco Use    Smoking status: Never Smoker    Smokeless tobacco: Never Used   Substance and Sexual Activity    Alcohol use: Yes     Frequency: 2-4 times a month     Drinks per session: 1 or 2     Binge frequency: Less than monthly     Comment: socially    Drug use: No    Sexual activity: Yes   Lifestyle    Physical activity:     Days per week: 5 days     Minutes per session: 60 min    Stress: To some extent   Relationships    Social connections:     Talks on phone: Twice a week     Gets together: Never     Attends Baptism service: Not on file     Active member of club or organization: No     Attends meetings of clubs or organizations: Never     Relationship status: Living with partner   Other Topics Concern    Not on file   Social History Narrative    Not on file       MEDICATIONS:   Current Outpatient Medications:     (Magic mouthwash) 1:1:1 Benadryl 12.5mg/5ml liq, aluminum & magnesium hydroxide-simehticone (Maalox), lidocaine viscous 2%, 10 cc swish and spit every 4 hours as needed for mouth sores or sore throat, Disp: 90 mL, Rfl: 0    valACYclovir (VALTREX) 500 MG tablet, Take 1 tablet (500 mg total) by mouth 2 (two) times daily., Disp: 30 tablet, Rfl: 0  ALLERGIES: Review of patient's allergies indicates:  No Known Allergies    Objective:     VITAL SIGNS: /76   Ht 5' 2" (1.575 m)   Wt 67.6 kg (149 lb)   BMI 27.25 kg/m²    General    Vitals reviewed.  Constitutional: She is oriented to person, place, and time. She appears well-developed and well-nourished.   Neurological: She is alert and oriented to person, place, and time.   Psychiatric: She has a normal mood and affect. Her behavior is normal.               MUSCULOSKELETAL EXAM  ANKLE: right ANKLE  The affected ankle is compared to the contralateral ankle.    Observation:     noankle edema, erythema, or ecchymosis.   Shoes reveal a normal wear pattern.  No structural deformities including pes " planus/cavus, hallux valgus, or toe deformities.  Negative too-many toes sign.    Normal callus pattern on the feet bilaterally.    Achilles tendon and calcaneal insertion reveals no deformities  No leg or intrinsic foot muscle atrophy.  Squatting reveals symmetric pronation of the bilateral feet, but positive heel cord tightness bilaterally  Able to rise on toes with symmetric supination, but pain at right medial ankle (improved following OMT)   Non-antalgic gait  Improved Right ankle stability with single leg raise    ROM (* = with pain):  Active dorsiflexion to 20° on left and 20° on right  Active plantarflexion to 50° on left and 50° on right    Active ankle inversion to 35° on left and 35° on right  Active ankle eversion to 15° on left and 15° on right  Full active flexion/extension of the toes bilaterally.   + Heel cords tightness bilaterally    Tenderness To Palpation:    No tenderness at the ATFL, CFL, PTFL  + tenderness at the deltoid ligament  No tenderness over the distal anterior syndesmosis, distal tibia/fibula, fibular head/shaft  No tenderness at medial or lateral malleoli   No tenderness at navicular, cuboid, cuneiforms, or talus  No tenderness at the calcaneous  No tenderness along the metatarsals or phalanges  No tenderness at the Achilles tendon calcaneal insertion  No tenderness at the posterior tibial   No tenderness at the peroneal tendons on right  No tenderness at the plantar fascia     Strength Testing (* = with pain):   Dorsiflexion - 5/5 on left and 5/5 on right  Platarflexion - 5/5 on left and 5/5 on right  Resisted Inversion - 5/5 on left and 5/5 on right  Resisted Eversion - 5/5 on left and 5/5 on right  Great Toe Extension - 5/5 on left and 5/5 on right  Great Toe Flexion - 5/5 on left and 5/5 on right    Vascular/Sensory Exam:  DP and PT pulses intact bilaterally  No skin changes, no abnormal hair distribution  Sensation intact to light touch throughout the saphenous, sural,  superficial peroneal, deep peroneal, and tibial nerve distributions  Negative Tinel's test over tarsal tunnel  2+/4 reflexes at L4 and S1 dermatomes  Capillary refill intact <2 seconds in all toes bilaterally.    TART (Tissue texture abnormality, Asymmetry,  Restriction of motion and/or Tenderness) changes:     Lumbar Spine   L1 Neutral   L2 Neutral   L3 Neutral   L4 FRS RIGHT   L5 FRS RIGHT       Pelvis:  · Innominate:Right anterior rotation  · Pubic bone:Right inferior pubic shear    Sacrum:Neutral    Lower Extremity:  · Leg lengths symmetric  · Left lateral ankle continual distortion (CD) fascial distortion   · Right medial ankle Trigger band (TB) fascial distortion  · Right medial calf Trigger band (TB) fascial distortion   · Right soleus Herniated trigger point (HTP) fascial distortion    Location/joint Finding/restriction   Fibular head Neutral   Tibia Neutral   Talocrural joint Bilateral   Subtalar Joint Neutral   Cuboid Neutral   Talo-navicular joint Bilateral   Navicular-cuneiform joint Bilateral   1st, 2nd, 3rd, 4th, 5th Cuneiform-metatarsal joint Bilateral   1st, 2nd, 3rd, 4th, 5th metatarsal Neutral   1st, 2nd, 3rd, 4th, 5th phalange Neutral       Key   F= Flexed   E = Extended   R = Rotated   S = Sidebent   TTA = tissue texture abnormality       Assessment:      Encounter Diagnoses   Name Primary?    Chronic pain of right ankle Yes    Ankle instability, right     Myalgia     Somatic dysfunction of lumbar region     Somatic dysfunction of pelvic region     Somatic dysfunction of lower extremity           Plan:   1. Chronic right ankle pain from previous ankle sprain about 8 months ago resulting in ankle instability and associated biomechanical restrictions of the lower kinetic chain -  continued improvement.   - OMT performed today to address associated biomechanical restrictions  and HEP reviewed.  - continue Ice up to 20 minutes at a time prn for pain control  - continue right ankle brace wear  increased activity prn for increased stability  - Recommend continued use of supportive, firm soled shoe  -  X-ray images of right ankle taken 3/23/2020 (AP, lateral, and oblique views) showed no abnormalities.     2. Reviewed with pt. the following HEP:  Continue daily:  A) Ankle proprioception exercises: balance on  ankle on single leg with eyes open, then progressing to balancing while eyes closed, and then on uneven surface (throw pillow or balance ball)  - hold each position for 30 seconds-1 mintue. Repeat 2-3 times a day for each ankle.   B) Ankle range of motion exercises: Spell out the alphabet with your ankle while seated, perform exercises 1-2 times daily.  C) Eccentric calf stretches:  Lower heels off of a step slowly until reaching end rand plantarflexion, repeating 10 reps twice daily.  Handout given  D)  Bilateral Calf stretching with knee flexed and extended: hold stretch for 30 seconds, repeating 2-3 times on each side. Do stretch twice daily  ADD:  E)  Pelvic clock exercises given to do from the 6-12 o'clock positions:10-15 reps, twice daily. Hand out of exercise also given.      The patient was taught a homegoing physical therapy regimen as described above. The patient demonstrated understanding of the exercises and proper technique of their execution.      3. Follow-up as needed if pain deteriorates or new issue arises    4. Patient agreeable to today's plan and all questions were answered    This note is dictated using the M*Modal Fluency Direct word recognition program. There are word recognition mistakes that are occasionally missed on review.    As per the guidelines from [facility], this patient's condition is considered time sensitive.  The visit could not be done via telemedicine. Treatment performed during this visit was medically necessary is to avoid further harm to the patient's underlying condition.

## 2020-08-26 ENCOUNTER — OFFICE VISIT (OUTPATIENT)
Dept: INTERNAL MEDICINE | Facility: CLINIC | Age: 35
End: 2020-08-26
Attending: FAMILY MEDICINE
Payer: COMMERCIAL

## 2020-08-26 ENCOUNTER — CLINICAL SUPPORT (OUTPATIENT)
Dept: REHABILITATION | Facility: OTHER | Age: 35
End: 2020-08-26
Attending: ANESTHESIOLOGY
Payer: COMMERCIAL

## 2020-08-26 ENCOUNTER — OFFICE VISIT (OUTPATIENT)
Dept: SPINE | Facility: CLINIC | Age: 35
End: 2020-08-26
Attending: FAMILY MEDICINE
Payer: COMMERCIAL

## 2020-08-26 ENCOUNTER — HOSPITAL ENCOUNTER (OUTPATIENT)
Dept: RADIOLOGY | Facility: OTHER | Age: 35
Discharge: HOME | End: 2020-08-26
Attending: ANESTHESIOLOGY
Payer: COMMERCIAL

## 2020-08-26 VITALS
BODY MASS INDEX: 26.49 KG/M2 | HEART RATE: 57 BPM | DIASTOLIC BLOOD PRESSURE: 68 MMHG | OXYGEN SATURATION: 99 % | WEIGHT: 143.94 LBS | HEIGHT: 62 IN | SYSTOLIC BLOOD PRESSURE: 102 MMHG

## 2020-08-26 VITALS
SYSTOLIC BLOOD PRESSURE: 100 MMHG | BODY MASS INDEX: 26.49 KG/M2 | HEIGHT: 62 IN | DIASTOLIC BLOOD PRESSURE: 63 MMHG | WEIGHT: 143.94 LBS | HEART RATE: 54 BPM | TEMPERATURE: 98 F

## 2020-08-26 DIAGNOSIS — M54.41 ACUTE BILATERAL LOW BACK PAIN WITH BILATERAL SCIATICA: ICD-10-CM

## 2020-08-26 DIAGNOSIS — R29.3 POOR POSTURE: ICD-10-CM

## 2020-08-26 DIAGNOSIS — M54.41 ACUTE BILATERAL LOW BACK PAIN WITH BILATERAL SCIATICA: Primary | ICD-10-CM

## 2020-08-26 DIAGNOSIS — M54.42 ACUTE BILATERAL LOW BACK PAIN WITH BILATERAL SCIATICA: ICD-10-CM

## 2020-08-26 DIAGNOSIS — M62.81 MUSCLE WEAKNESS OF LOWER EXTREMITY: ICD-10-CM

## 2020-08-26 DIAGNOSIS — M54.42 ACUTE BILATERAL LOW BACK PAIN WITH BILATERAL SCIATICA: Primary | ICD-10-CM

## 2020-08-26 DIAGNOSIS — M54.9 DORSALGIA, UNSPECIFIED: ICD-10-CM

## 2020-08-26 DIAGNOSIS — M47.816 LUMBAR SPONDYLOSIS: ICD-10-CM

## 2020-08-26 DIAGNOSIS — M47.816 LUMBAR SPONDYLOSIS: Primary | ICD-10-CM

## 2020-08-26 PROCEDURE — 99999 PR PBB SHADOW E&M-EST. PATIENT-LVL III: ICD-10-PCS | Mod: PBBFAC,,, | Performed by: FAMILY MEDICINE

## 2020-08-26 PROCEDURE — 3008F PR BODY MASS INDEX (BMI) DOCUMENTED: ICD-10-PCS | Mod: CPTII,S$GLB,, | Performed by: FAMILY MEDICINE

## 2020-08-26 PROCEDURE — 99999 PR PBB SHADOW E&M-EST. PATIENT-LVL V: ICD-10-PCS | Mod: PBBFAC,,, | Performed by: ANESTHESIOLOGY

## 2020-08-26 PROCEDURE — 72110 X-RAY EXAM L-2 SPINE 4/>VWS: CPT | Mod: TC,FY

## 2020-08-26 PROCEDURE — 99214 OFFICE O/P EST MOD 30 MIN: CPT | Mod: S$GLB,,, | Performed by: FAMILY MEDICINE

## 2020-08-26 PROCEDURE — 3008F BODY MASS INDEX DOCD: CPT | Mod: CPTII,S$GLB,, | Performed by: FAMILY MEDICINE

## 2020-08-26 PROCEDURE — 72110 X-RAY EXAM L-2 SPINE 4/>VWS: CPT | Mod: 26,,, | Performed by: RADIOLOGY

## 2020-08-26 PROCEDURE — 99244 OFF/OP CNSLTJ NEW/EST MOD 40: CPT | Mod: S$GLB,,, | Performed by: ANESTHESIOLOGY

## 2020-08-26 PROCEDURE — 97162 PT EVAL MOD COMPLEX 30 MIN: CPT | Mod: PN

## 2020-08-26 PROCEDURE — 97110 THERAPEUTIC EXERCISES: CPT | Mod: PN

## 2020-08-26 PROCEDURE — 99999 PR PBB SHADOW E&M-EST. PATIENT-LVL V: CPT | Mod: PBBFAC,,, | Performed by: ANESTHESIOLOGY

## 2020-08-26 PROCEDURE — 99244 PR OFFICE CONSULTATION,LEVEL IV: ICD-10-PCS | Mod: S$GLB,,, | Performed by: ANESTHESIOLOGY

## 2020-08-26 PROCEDURE — 99214 PR OFFICE/OUTPT VISIT, EST, LEVL IV, 30-39 MIN: ICD-10-PCS | Mod: S$GLB,,, | Performed by: FAMILY MEDICINE

## 2020-08-26 PROCEDURE — 72110 XR LUMBAR SPINE 5 VIEW WITH FLEX AND EXT: ICD-10-PCS | Mod: 26,,, | Performed by: RADIOLOGY

## 2020-08-26 PROCEDURE — 99999 PR PBB SHADOW E&M-EST. PATIENT-LVL III: CPT | Mod: PBBFAC,,, | Performed by: FAMILY MEDICINE

## 2020-08-26 RX ORDER — IBUPROFEN 200 MG
200 TABLET ORAL
COMMUNITY
End: 2020-09-21

## 2020-08-26 RX ORDER — CYCLOBENZAPRINE HCL 5 MG
5 TABLET ORAL 3 TIMES DAILY PRN
Qty: 30 TABLET | Refills: 1 | Status: SHIPPED | OUTPATIENT
Start: 2020-08-26 | End: 2020-09-25

## 2020-08-26 RX ORDER — CELECOXIB 200 MG/1
200 CAPSULE ORAL DAILY
Qty: 90 CAPSULE | Refills: 1 | Status: SHIPPED | OUTPATIENT
Start: 2020-08-26 | End: 2021-02-24 | Stop reason: SDUPTHER

## 2020-08-26 RX ORDER — GABAPENTIN 300 MG/1
300 CAPSULE ORAL 3 TIMES DAILY
Qty: 90 CAPSULE | Refills: 2 | Status: SHIPPED | OUTPATIENT
Start: 2020-08-26 | End: 2021-11-01 | Stop reason: SDUPTHER

## 2020-08-26 RX ORDER — CELECOXIB 200 MG/1
200 CAPSULE ORAL 2 TIMES DAILY PRN
Qty: 60 CAPSULE | Refills: 0 | Status: SHIPPED | OUTPATIENT
Start: 2020-08-26 | End: 2020-09-21

## 2020-08-26 RX ORDER — HYDROCODONE BITARTRATE AND ACETAMINOPHEN 7.5; 325 MG/1; MG/1
TABLET ORAL
COMMUNITY
Start: 2020-08-11 | End: 2020-09-21

## 2020-08-26 NOTE — LETTER
August 26, 2020      Maxwell Fischer MD  [address]  [facility] [address]           [facility] [address]  [address], [address]  [facility] [address]  Phone: [phone]  Fax: [phone]          Patient: Nakia Jaime   MR Number: [id number]   YOB: 1985   Date of Visit: 8/26/2020       Dear Dr. Maxwell Fischer:    Thank you for referring Nakia Jaime to me for evaluation. Attached you will find relevant portions of my assessment and plan of care.    If you have questions, please do not hesitate to call me. I look forward to following Nakia Jaime along with you.    Sincerely,    Allen Moreno MD    Enclosure  CC:  No Recipients    If you would like to receive this communication electronically, please contact [email or web address] or [phone] to request more information on [employer] access.    For providers and/or their staff who would like to refer a patient to [facility], please contact us through our one-stop-shop provider referral line, [facility], at [phone].    If you feel you have received this communication in error or would no longer like to receive these types of communications, please e-mail [email or web address]

## 2020-08-26 NOTE — PATIENT INSTRUCTIONS
"SI joint Muscle energy for L Posterior/R anterior rotation              Bring both knees up towards your chest as shown in the picture.  Place your Left hand on top of your Left thigh, and your Right hand on the back of your Right thigh.  Push your legs into each hand with about 50% effort.      Perform 3 reps and hold for 3 seconds. Perform twice a day.    Copyright © 5547-4076 [employer]    Supine Active Hamstring Stretch        Grasp behind knee and keep leg at arms length. Extend leg up until a gentle stretch is found behind your knee.     The opposite knee can be bent or straight at your therapists discretion. Perform 10 reps holding for 10 seconds.     Perform twice a day.    Copyright © 0660-7936 [employer]      GLUTE SET - SUPINE        While lying on your back, squeeze your buttocks and hold. Repeat. The opposite knee can be bent or straight at your therapists discretion. Perform 10 reps holding for 10 seconds.    Perform twice a day.    Copyright © 5245-9788 [employer]    BRIDGING        While lying on your back, tighten your lower abdominals, squeeze your buttocks and then raise your buttocks off the floor/bed as creating a "Bridge" with your body. Hold and then lower yourself and repeat. Perform 10 reps holding for 10 seconds.    Perform twice a day.    Copyright © 8561-2099 [employer]      HIP ABDUCTION - SIDELYING            While lying on your side, slowly raise up your top leg to the side. Keep your knee straight and maintain your toes pointed forward the entire time. Keep your leg in-line with your body.  The bottom leg can be bent to stabilize your body.    Hold and then lower yourself and repeat. Perform 10 reps holding for 10 seconds.    Perform twice a day.    Copyright © 0939-7041 [employer]    CLAM SHELLS      While lying on your side with your knees bent, draw up the top knee while keeping contact of your feet together.    Do not let your pelvis roll back during the lifting " movement.    Hold and then lower yourself and repeat. Perform 10 reps holding for 10 seconds.    Perform twice a day.    Copyright © 6715-8522 [employer]

## 2020-08-26 NOTE — PROGRESS NOTES
Chronic Pain - New Consult    Referring Physician: Maxwell Fischer*    Chief Complaint: Low back pain     SUBJECTIVE:    Nakia Jaime presents to the clinic for the evaluation of low back pain. The pain started 2 weeks ago and symptoms have been worsening.The pain is located in the low back area and radiates to the bilateral groins.  The pain is described as aching, sharp and shooting and is rated as 7/10. The pain is rated with a score of  5/10 on the BEST day and a score of 8/10 on the WORST day.  Symptoms interfere with work. The pain is exacerbated by Sitting, Bending and Coughing/Sneezing.  The pain is mitigated by medications. The patient reports 7 hours of uninterrupted sleep per night. She has tried stretching and advil with some relief.    Patient denies night fever/night sweats, urinary incontinence, bowel incontinence, significant weight loss, significant motor weakness and loss of sensations.    Physical Therapy/Home Exercise: yes for her ankle    Pain Disability Index Review:  Last 3 PDI Scores 8/26/2020   Pain Disability Index (PDI) 21       Pain Medications:  Advil     report:  Reviewed and consistent with medication use as prescribed.    Pain Procedures: none    Imaging: none    Past Medical History:   Diagnosis Date    COPD (chronic obstructive pulmonary disease)      Past Surgical History:   Procedure Laterality Date    crown tooth      LUNG BIOPSY      drained lungs of fluid     Social History     Socioeconomic History    Marital status: Single     Spouse name: Not on file    Number of children: Not on file    Years of education: Not on file    Highest education level: Not on file   Occupational History    Not on file   Social Needs    Financial resource strain: Not hard at all    Food insecurity     Worry: Never true     Inability: Never true    Transportation needs     Medical: Patient refused     Non-medical: No   Tobacco Use    Smoking status: Never Smoker     Smokeless tobacco: Never Used   Substance and Sexual Activity    Alcohol use: Yes     Frequency: 2-4 times a month     Drinks per session: 1 or 2     Binge frequency: Less than monthly     Comment: socially    Drug use: No    Sexual activity: Yes   Lifestyle    Physical activity     Days per week: 5 days     Minutes per session: 60 min    Stress: To some extent   Relationships    Social connections     Talks on phone: Twice a week     Gets together: Never     Attends Zoroastrianism service: Not on file     Active member of club or organization: No     Attends meetings of clubs or organizations: Never     Relationship status: Living with partner   Other Topics Concern    Not on file   Social History Narrative    Not on file     Family History   Problem Relation Age of Onset    Hypertension Father     Diabetes Father     No Known Problems Mother        Review of patient's allergies indicates:  No Known Allergies    Current Outpatient Medications   Medication Sig    celecoxib (CELEBREX) 200 MG capsule Take 1 capsule (200 mg total) by mouth once daily.    HYDROcodone-acetaminophen (NORCO) 7.5-325 mg per tablet TK 1 T PO  Q 8 H PRN P    ibuprofen (ADVIL,MOTRIN) 200 MG tablet Take 200 mg by mouth.    celecoxib (CELEBREX) 200 MG capsule Take 1 capsule (200 mg total) by mouth 2 (two) times daily as needed for Pain.    cyclobenzaprine (FLEXERIL) 5 MG tablet Take 1 tablet (5 mg total) by mouth 3 (three) times daily as needed for Muscle spasms.    gabapentin (NEURONTIN) 300 MG capsule Take 1 capsule (300 mg total) by mouth 3 (three) times daily. One at night for 7 days, then 2 a day for 7 days, then 3 a day    valACYclovir (VALTREX) 500 MG tablet Take 1 tablet (500 mg total) by mouth 2 (two) times daily. (Patient not taking: Reported on 8/26/2020)     No current facility-administered medications for this visit.        REVIEW OF SYSTEMS:    GENERAL:  No weight loss, malaise or fevers.  HEENT:  Negative for frequent  "or significant headaches.  NECK:  Negative for lumps, goiter, pain and significant neck swelling.  RESPIRATORY:  Negative for cough, wheezing or shortness of breath.  CARDIOVASCULAR:  Negative for chest pain, leg swelling or palpitations.  GI:  Negative for abdominal discomfort, blood in stools or black stools or change in bowel habits.  MUSCULOSKELETAL:  + Lower back pain  SKIN:  Negative for lesions, rash, and itching.  PSYCH:  Negative for  mood disorder and recent psychosocial stressors. sleep disturbance  HEMATOLOGY/LYMPHOLOGY:  Negative for prolonged bleeding, bruising easily or swollen nodes.  NEURO:   No history of headaches, syncope, paralysis, seizures or tremors.  All other reviewed and negative other than HPI.    OBJECTIVE:    /63 (BP Location: Left arm, Patient Position: Sitting)   Pulse (!) 54   Temp 98 °F (36.7 °C)   Ht 5' 2" (1.575 m)   Wt 65.3 kg (143 lb 15.4 oz)   BMI 26.33 kg/m²     PHYSICAL EXAMINATION:    General appearance: Well appearing, in no acute distress, alert and oriented x3.  Psych:  Mood and affect appropriate.  Skin: Skin color, texture, turgor normal, no rashes or lesions, in both upper and lower body.  Head/face:  Normocephalic, atraumatic. No palpable lymph nodes.  Neck: No pain to palpation over the cervical paraspinous muscles. Spurling Negative. No pain with neck flexion, extension, or lateral flexion.   Cor: RRR  Pulm: CTA  GI:  Soft and non-tender.  Back: Straight leg raising in the sitting and supine positions is negative to radicular pain. Pain to palpation over the lumbar spine paraspinal area. Positive facet loading on the right,  Normal range of motion without pain reproduction.  Extremities: Peripheral joint ROM is full and pain free without obvious instability or laxity in all four extremities. No deformities, edema, or skin discoloration. Good capillary refill.  Musculoskeletal: Shoulder, hip, and knee provocative maneuvers are negative. Positive MARIANN on " the left.  Bilateral upper and lower extremity strength is normal and symmetric.  No atrophy or tone abnormalities are noted.  Neuro: Bilateral upper and lower extremity coordination and muscle stretch reflexes are physiologic and symmetric.  Plantar response are downgoing. No loss of sensation is noted.  Gait: normal.    ASSESSMENT: 35 y.o. year old female with low back pain, consistent with     1. Lumbar spondylosis  Ambulatory referral/consult to Physical/Occupational Therapy   2. Acute bilateral low back pain with bilateral sciatica  Ambulatory referral/consult to Pain Clinic    Ambulatory referral/consult to Physical/Occupational Therapy   3. Dorsalgia, unspecified  X-Ray Lumbar Complete With Flex And Ext         PLAN:     - I have stressed the importance of physical activity and a home exercise plan to help with pain and improve health.  - Referral to Physical therapy for Lumbar stabilization, core strengthening, and a home exercise program.  - Order Flexion-Extension X-rays of the Lumbar spine to rule out any instability.  - Start Neurontin 300mg gradually to three times a day to help with radicular pain.  - Start Flexeril 5 mg tid prn to help with pain.  - Start Celebrex 200 mg twice a day to help with patients pain.   - RTC 4 weeks.  - Counseled patient regarding the importance of activity modification and physical therapy.    The above plan and management options were discussed at length with patient. Patient is in agreement with the above and verbalized understanding. It will be communicated with the referring physician via electronic record, fax, or mail.    Curtis Liang  08/26/2020     I have reviewed and concur with the resident's history, physical, assessment, and plan.  I have personally interviewed and examined the patient at bedside.  See below addendum for my evaluation and additional findings.  35-year-old female with chronic lower back pain consistent with lumbar spondylosis versus  myofascial pain.  We will refer her to physical therapy and order lumbar spine x-ray with flexion-extension views.  Will start her on gabapentin, Flexeril and Celebrex.  Will follow up with her in 4 weeks.  If her pain remains persistent will consider lumbar spine MRI to further evaluate the etiology of her pain.    Allen Moreno MD

## 2020-08-26 NOTE — PROGRESS NOTES
"CHIEF COMPLAINT: Establish a primary care physician    HISTORY OF PRESENT ILLNESS: The patient is a perfectly healthy 34-year-old white female.  The patient has Several weeks worsening LBP.  There are no radicular or myelopathic findings.  No trauma at the onset of pain.  Over-the-counter medications and rest have not helped much.    REVIEW OF SYSTEMS:  GENERAL: No fever, chills, fatigability or weight loss.  SKIN: No rashes, itching or changes in color or texture of skin.  Except for the dermal cyst  HEAD: No headaches or recent head trauma.  EYES: Visual acuity fine. No photophobia, ocular pain or diplopia.  EARS: Denies ear pain, discharge or vertigo.  NOSE: No loss of smell, no epistaxis or postnasal drip.  MOUTH & THROAT: No hoarseness or change in voice. No excessive gum bleeding.  NODES: Denies swollen glands.  CHEST: Denies FIGUEROA, cyanosis, wheezing, cough and sputum production.  CARDIOVASCULAR: Denies chest pain, PND, orthopnea or reduced exercise tolerance.  ABDOMEN: Appetite fine. No weight loss. Denies diarrhea, abdominal pain, hematemesis or blood in stool.  URINARY: No flank pain, dysuria or hematuria.  PERIPHERAL VASCULAR: No claudication or cyanosis.  MUSCULOSKELETAL:  She has right ankle joint stiffness + swelling and back pain.  NEUROLOGIC: No history of seizures, paralysis, alteration of gait or coordination.    SOCIAL HISTORY: The patient does not smoke.  The patient consumes alcohol socially.  The patient is single.  She works at [employer]    PHYSICAL EXAMINATION:   Blood pressure 102/68, pulse (!) 57, height 5' 2" (1.575 m), weight 65.3 kg (143 lb 15.4 oz), SpO2 99 %.    APPEARANCE: Well nourished, well developed, in no acute distress.  She does appear uncomfortable  HEAD: Normocephalic, atraumatic.  EYES: PERRL. EOMI.  Conjunctivae without injection and  anicteric  NOSE: Mucosa pink. Airway clear.  MOUTH & THROAT: No tonsillar enlargement. No pharyngeal erythema or exudate. No " stridor.  NECK: Supple.   NODES: No cervical, axillary or inguinal lymph node enlargement.  CHEST: Lungs clear to auscultation.  No retractions are noted.  No rales or rhonchi are present.  CARDIOVASCULAR: Normal S1, S2. No rubs, murmurs or gallops.  ABDOMEN: Bowel sounds normal. Not distended. Soft. No tenderness or masses.  No ascites is noted.  MUSCULOSKELETAL:  There is no clubbing, cyanosis, or edema of the extremities x4.  There is full range of motion of the lumbar spine.  There is full range of motion of the extremities x4.  There is no deformity noted.    NEUROLOGIC:       Normal speech development.      Hearing normal.      Antalgic gait.      Motor and sensory exams grossly normal.  PSYCHIATRIC: Patient is alert and oriented x3.  Thought processes are all normal.  There is no homicidality.  There is no suicidality.  There is no evidence of psychosis.    LABORATORY/RADIOLOGY:   Chart reviewed.     ASSESSMENT:   Several weeks worsening LBP    PLAN:  [facility]  Return to clinic in one year.

## 2020-08-26 NOTE — PLAN OF CARE
[facility]  Physical Therapy Initial Evaluation    Name: Nakia Jaime  Clinic Number: [id number]    Therapy Diagnosis:   Encounter Diagnoses   Name Primary?    Acute bilateral low back pain with bilateral sciatica     Lumbar spondylosis     Muscle weakness of lower extremity     Poor posture      Physician: Allen Moreno MD    Physician Orders: PT Eval and Treat   Medical Diagnosis: M54.42,M54.41 (ICD-10-CM) - Acute bilateral low back pain with bilateral sciatica  Evaluation Date: 8/26/2020  Authorization Period Expiration: 12/31/2020  Plan of Care Certification Period: 10/21/2020  Visit # / Visits authorized: 1/ 20    Time In: 4:15 PM  Time Out: 4:55 PM  Total Billable Time: 40 minutes    Precautions: Standard      Subjective     History of current condition - Nakia is a 34 yo F referred to low back pain that has been worsening for 2 weeks.       Past Medical History:   Diagnosis Date    COPD (chronic obstructive pulmonary disease)      Nakia Jaime  has a past surgical history that includes Lung biopsy and crown tooth.    Nakia has a current medication list which includes the following prescription(s): celecoxib, celecoxib, cyclobenzaprine, gabapentin, hydrocodone-acetaminophen, ibuprofen, and valacyclovir.    Review of patient's allergies indicates:  No Known Allergies     Imaging:  X-ray  8/26/2021  FINDINGS:  Five vertebrae of lumbar configuration are demonstrated which are normal in height with between 2-3 mm anterior positioning of L5 in relation to the sacrum and are in lordotic alignment maintaining alignment upon flexion and extension.     There is loss of disc space height and minor endplate osteophyte formation at L5-S1 with otherwise unremarkable radiographic appearance of the lumbar disc spaces.  There are no pars defects and there is symmetric unremarkable appearance of the sacroiliac joints.  The psoas shadows are sharply outlined.       Prior Therapy: PT  "for R ankle sprain  Social History: single lives alone. Single level raised home.  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  Easing Factors: massage, relaxation, heating pad and rest    Radicular symptoms: tingling into R buttock and hip  Bowel and Bladder incontinence: none    Sleep is not disturbed. Sleeping position: back, side      Pts goals: "I don't want to feel any back pain."    Objective     Postural examination in standing:  - increased lumbar lordosis  - forward head  - forward shoulders  - R anterior rotated innominate, L rotated innominate    Postural examination in sitting:   - decreased lumbar lordosis  - forward head  - forward shoulders      Functional assessment:   - walking: I  - sit to stand: I  - sit to supine: I      - supine to sit: I  - supine to prone: I    Lumbar active range of motion in standing is:  Flexion 65 deg, pain with return   Extension 25 deg with pain   Left side bending 25 deg   Right side bending 28 deg   Left rotation Decreased 10 deg   Right rotation WFL       Flexibility testing:  - hamstrings:         B: tight, R: -40 deg, L: -40 deg             - gastrocnemius:   B: tight, R: neutral, L: 7 deg          - piriformis:            B: WNL       - quadriceps:         B: WNL  - hip flexors:           B: WFL  - hip adductors:      B: WNL  - IT Bands:             R: tight, decreased 50%, L:WFL     MMT R L   Hip flexion 5/5 5/5   Hip abduction 5/5 5/5   Hip extension 3+/5 4-/5   Hip ER 5/5 5/5   Hip IR 3+/5 4/5   Knee extension 5/5 5/5   Knee flexion 5/5 5/5   Ankle dorsiflexion 5/5 5/5   Ankle plantar flexion 5/5 5/5   Ankle inversion 3+/5 " "5/5   Ankle eversion 3+/5 5/5       Endurance is good.      Lumbar Special tests    SLR negative   Cross SLR negative   MARIANN positive   Prone Instability Test positive     Palpation: tenderness in B lumbar paraspinals and tenderness with PA joint play in lumbar spinal segments     Sensation:  - Light Touch: intact  - Saddle: intact    Reflexes:   - Knee Jerk: R: 1+ , L: 2+    CMS Impairment/Limitation/Restriction for FOTO Low back Survey    Therapist reviewed FOTO scores for Nakia Jaime on 8/26/2020.   FOTO documents entered into [employer] - see Media section.    Limitation Score: 50%  Category: Body Position    Current : CK = at least 40% but < 60% impaired, limited or restricted  Goal: CJ = at least 20% but < 40% impaired, limited or restricted       TREATMENT   Treatment Time In: 4:40 PM  Treatment Time Out: 4:55 PM  Total Treatment time separate from Evaluation time: 15 minutes    Nakia received therapeutic exercises to develop strength, ROM, flexibility, posture and core stabilization for 15 minutes including:  Push/pull 3 x 3"  Supine active hamstring stretches 10 x 10"  Gluteal set 10 x 10"  Bridging 10 x 10"      Home Exercises Provided and Patient Education Provided     Education provided:   - Discussed the role of the PTA on the Rehab Team. Also discussed the use of the [facility] Portal for communication.    Push/pull  Supine active hamstring stretches  Gluteal set  Bridging  Side lying clams  Side lying hip abd    Written Home Exercises Provided: yes.  Exercises were reviewed and Nakia was able to demonstrate them prior to the end of the session.  Nakia demonstrated good  understanding of the education provided.     See EMR under Patient Instructions for exercises provided 8/26/2020.    Assessment   Nkaia is a 35 y.o. female referred to outpatient Physical Therapy with a medical diagnosis of M54.42,M54.41 (ICD-10-CM) - Acute bilateral low back pain with bilateral sciatica. Pt presents with pelvic " malalignment, decreased B LE strength and flexibility, weak core contributing to low back pain . Will benefit from postural reeducation, core strengthening, manual therapy, B LE strengthening and stretching, and dry needling to decrease low back pain and achieve below listed goals.    Pt prognosis is Good.   Pt will benefit from skilled outpatient Physical Therapy to address the deficits stated above and in the chart below, provide pt/family education, and to maximize pt's level of independence.     Plan of care discussed with patient: Yes  Pt's spiritual, cultural and educational needs considered and patient is agreeable to the plan of care and goals as stated below:     Anticipated Barriers for therapy: none    Medical Necessity is demonstrated by the following  History  Co-morbidities and personal factors that may impact the plan of care Co-morbidities:   COPD/asthma    Personal Factors:   no deficits     moderate   Examination  Body Structures and Functions, activity limitations and participation restrictions that may impact the plan of care Body Regions:   back  lower extremities    Body Systems:    ROM  strength    Participation Restrictions:   none    Activity limitations:   Learning and applying knowledge  no deficits    General Tasks and Commands  no deficits    Communication  no deficits    Mobility  lifting and carrying objects  walking    Self care  no deficits    Domestic Life  no deficits    Interactions/Relationships  no deficits    Life Areas  no deficits    Community and Social Life  no deficits         moderate   Clinical Presentation evolving clinical presentation with changing clinical characteristics moderate   Decision Making/ Complexity Score: moderate     Goals:  Short Term Goals: 4 weeks   1. Independent with HEP.  2. Report decreased lumbar pain < or =  6/10 with adls such as sitting, standing, bending, lifting and negotiating steps while carrying something.  3. Increased MMT for B LE by 1  muscle grade to promote proper pelvic stability to decrease  lumbar pain < or =  6/10 with adls such as sitting, standing, bending, lifting and negotiating steps while carrying something.     Long Term Goals: 8 weeks   4. Report decreased lumbar pain < or =  3/10 with adls such as sitting, standing, bending, lifting and negotiating steps while carrying something.  5. Increased MMT for B LE to 5/5 muscle grade to promote proper pelvic stability to decrease lumbar pain < or =  3/10 with adls such as sitting, standing, bending, lifting and negotiating steps while carrying something.   6. Increased flexibility in B hamstrings to -20 deg with 90/90 test to promote proper pelvic stability to decrease lumbar pain < or =  3/10 with adls such as sitting, standing, bending, lifting and negotiating steps while carrying something.  7. Increased flexibility in B IT bands to promote proper pelvic stability to decrease lumbar pain < or =  3/10 with adls such as sitting, standing, bending, lifting and negotiating steps while carrying something.    8. Patient to achieve CJ at least 20% < 40% impaired, limited or restricted level on the FOTO Outcomes Measurement System.    Plan   Certification Period/Plan of care expiration: 8/26/2020 to 10/21/2020.    Outpatient Physical Therapy 2 times weekly for 8 weeks to include the following interventions: Manual Therapy, Moist Heat/ Ice, Neuromuscular Re-ed, Patient Education, Therapeutic Exercise and dry needling.     Maxwell Brown, PT

## 2020-08-26 NOTE — PROGRESS NOTES
Chronic Pain - New Consult    Referring Physician: Maxwell Fischer*    Chief Complaint: Low back pain     SUBJECTIVE:    Nakia Jaime presents to the clinic for the evaluation of low back pain. The pain started 2 weeks ago and symptoms have been worsening.The pain is located in the low back area and radiates to the bilateral groins.  The pain is described as aching, sharp and shooting and is rated as 7/10. The pain is rated with a score of  5/10 on the BEST day and a score of 8/10 on the WORST day.  Symptoms interfere with work. The pain is exacerbated by Sitting, Bending and Coughing/Sneezing.  The pain is mitigated by medications. The patient reports 7 hours of uninterrupted sleep per night. She has tried stretching and advil with some relief.    Patient denies night fever/night sweats, urinary incontinence, bowel incontinence, significant weight loss, significant motor weakness and loss of sensations.    Physical Therapy/Home Exercise: yes for her ankle    Pain Disability Index Review:  Last 3 PDI Scores 8/26/2020   Pain Disability Index (PDI) 21       Pain Medications:  Advil     report:  Reviewed and consistent with medication use as prescribed.    Pain Procedures: none    Imaging: none    Past Medical History:   Diagnosis Date    COPD (chronic obstructive pulmonary disease)      Past Surgical History:   Procedure Laterality Date    crown tooth      LUNG BIOPSY      drained lungs of fluid     Social History     Socioeconomic History    Marital status: Single     Spouse name: Not on file    Number of children: Not on file    Years of education: Not on file    Highest education level: Not on file   Occupational History    Not on file   Social Needs    Financial resource strain: Not hard at all    Food insecurity     Worry: Never true     Inability: Never true    Transportation needs     Medical: Patient refused     Non-medical: No   Tobacco Use    Smoking status: Never Smoker     Smokeless tobacco: Never Used   Substance and Sexual Activity    Alcohol use: Yes     Frequency: 2-4 times a month     Drinks per session: 1 or 2     Binge frequency: Less than monthly     Comment: socially    Drug use: No    Sexual activity: Yes   Lifestyle    Physical activity     Days per week: 5 days     Minutes per session: 60 min    Stress: To some extent   Relationships    Social connections     Talks on phone: Twice a week     Gets together: Never     Attends Congregational service: Not on file     Active member of club or organization: No     Attends meetings of clubs or organizations: Never     Relationship status: Living with partner   Other Topics Concern    Not on file   Social History Narrative    Not on file     Family History   Problem Relation Age of Onset    Hypertension Father     Diabetes Father     No Known Problems Mother        Review of patient's allergies indicates:  No Known Allergies    Current Outpatient Medications   Medication Sig    celecoxib (CELEBREX) 200 MG capsule Take 1 capsule (200 mg total) by mouth once daily.    HYDROcodone-acetaminophen (NORCO) 7.5-325 mg per tablet TK 1 T PO  Q 8 H PRN P    ibuprofen (ADVIL,MOTRIN) 200 MG tablet Take 200 mg by mouth.    celecoxib (CELEBREX) 200 MG capsule Take 1 capsule (200 mg total) by mouth 2 (two) times daily as needed for Pain.    cyclobenzaprine (FLEXERIL) 5 MG tablet Take 1 tablet (5 mg total) by mouth 3 (three) times daily as needed for Muscle spasms.    gabapentin (NEURONTIN) 300 MG capsule Take 1 capsule (300 mg total) by mouth 3 (three) times daily. One at night for 7 days, then 2 a day for 7 days, then 3 a day    valACYclovir (VALTREX) 500 MG tablet Take 1 tablet (500 mg total) by mouth 2 (two) times daily. (Patient not taking: Reported on 8/26/2020)     No current facility-administered medications for this visit.        REVIEW OF SYSTEMS:    GENERAL:  No weight loss, malaise or fevers.  HEENT:  Negative for frequent  "or significant headaches.  NECK:  Negative for lumps, goiter, pain and significant neck swelling.  RESPIRATORY:  Negative for cough, wheezing or shortness of breath.  CARDIOVASCULAR:  Negative for chest pain, leg swelling or palpitations.  GI:  Negative for abdominal discomfort, blood in stools or black stools or change in bowel habits.  MUSCULOSKELETAL:  + Lower back pain  SKIN:  Negative for lesions, rash, and itching.  PSYCH:  Negative for  mood disorder and recent psychosocial stressors. sleep disturbance  HEMATOLOGY/LYMPHOLOGY:  Negative for prolonged bleeding, bruising easily or swollen nodes.  NEURO:   No history of headaches, syncope, paralysis, seizures or tremors.  All other reviewed and negative other than HPI.    OBJECTIVE:    /63 (BP Location: Left arm, Patient Position: Sitting)   Pulse (!) 54   Temp 98 °F (36.7 °C)   Ht 5' 2" (1.575 m)   Wt 65.3 kg (143 lb 15.4 oz)   BMI 26.33 kg/m²     PHYSICAL EXAMINATION:    General appearance: Well appearing, in no acute distress, alert and oriented x3.  Psych:  Mood and affect appropriate.  Skin: Skin color, texture, turgor normal, no rashes or lesions, in both upper and lower body.  Head/face:  Normocephalic, atraumatic. No palpable lymph nodes.  Neck: No pain to palpation over the cervical paraspinous muscles. Spurling Negative. No pain with neck flexion, extension, or lateral flexion.   Cor: RRR  Pulm: CTA  GI:  Soft and non-tender.  Back: Straight leg raising in the sitting and supine positions is negative to radicular pain. Pain to palpation over the lumbar spine paraspinal area. Positive facet loading on the right,  Normal range of motion without pain reproduction.  Extremities: Peripheral joint ROM is full and pain free without obvious instability or laxity in all four extremities. No deformities, edema, or skin discoloration. Good capillary refill.  Musculoskeletal: Shoulder, hip, and knee provocative maneuvers are negative. Positive MARIANN on " the left.  Bilateral upper and lower extremity strength is normal and symmetric.  No atrophy or tone abnormalities are noted.  Neuro: Bilateral upper and lower extremity coordination and muscle stretch reflexes are physiologic and symmetric.  Plantar response are downgoing. No loss of sensation is noted.  Gait: normal.    ASSESSMENT: 35 y.o. year old female with low back pain, consistent with     1. Lumbar spondylosis  Ambulatory referral/consult to Physical/Occupational Therapy   2. Acute bilateral low back pain with bilateral sciatica  Ambulatory referral/consult to Pain Clinic    Ambulatory referral/consult to Physical/Occupational Therapy   3. Dorsalgia, unspecified  X-Ray Lumbar Complete With Flex And Ext         PLAN:     - I have stressed the importance of physical activity and a home exercise plan to help with pain and improve health.  - Referral to Physical therapy for Lumbar stabilization, core strengthening, and a home exercise program.  - Order Flexion-Extension X-rays of the Lumbar spine to rule out any instability.  - Start Neurontin 300mg gradually to three times a day to help with radicular pain.  - Start Flexeril 5 mg tid prn to help with pain.  - Start Celebrex 200 mg twice a day to help with patients pain.   - RTC 4 weeks.  - Counseled patient regarding the importance of activity modification and physical therapy.    The above plan and management options were discussed at length with patient. Patient is in agreement with the above and verbalized understanding. It will be communicated with the referring physician via electronic record, fax, or mail.    Curtis Liang  08/26/2020     I have reviewed and concur with the resident's history, physical, assessment, and plan.  I have personally interviewed and examined the patient at bedside.  See below addendum for my evaluation and additional findings.  35-year-old female with chronic lower back pain consistent with lumbar spondylosis versus  myofascial pain.  We will refer her to physical therapy and order lumbar spine x-ray with flexion-extension views.  Will start her on gabapentin, Flexeril and Celebrex.  Will follow up with her in 4 weeks.  If her pain remains persistent will consider lumbar spine MRI to further evaluate the etiology of her pain.    Allen Moreno MD

## 2020-08-31 ENCOUNTER — CLINICAL SUPPORT (OUTPATIENT)
Dept: REHABILITATION | Facility: OTHER | Age: 35
End: 2020-08-31
Attending: ANESTHESIOLOGY
Payer: COMMERCIAL

## 2020-08-31 DIAGNOSIS — R29.3 POOR POSTURE: ICD-10-CM

## 2020-08-31 DIAGNOSIS — M62.81 MUSCLE WEAKNESS OF LOWER EXTREMITY: ICD-10-CM

## 2020-08-31 PROCEDURE — 97110 THERAPEUTIC EXERCISES: CPT | Mod: PN,CQ

## 2020-08-31 NOTE — PROGRESS NOTES
"    Physical Therapy Daily Treatment Note     Name: Nakia Jaime  Clinic Number: [id number]    Therapy Diagnosis:   Encounter Diagnoses   Name Primary?    Muscle weakness of lower extremity     Poor posture      Physician: Allen Moreno MD    Visit Date: 8/31/2020    Physician Orders: PT Eval and Treat   Medical Diagnosis: M54.42,M54.41 (ICD-10-CM) - Acute bilateral low back pain with bilateral sciatica  Evaluation Date: 8/26/2020  Authorization Period Expiration: 12/31/2020  Plan of Care Certification Period: 10/21/2020  Visit # / Visits authorized: 1/ 20       Time In: 1519 (pt arrived 19 minutes late )  Time Out: 1550  Total Billable Time: 31 minutes  Precautions: Standard     Precautions: Standard         Subjective     Pt reports: feeling stiff and sore in her Low back. States she felt a little more flexible after her last PT session.   She was compliant with home exercise program.  Response to previous treatment: tolerated well  Functional change: none yet    Prior Level of Function: I with amb and ADL  Current Level of Function: I with amb and ADL     Pain:  Current 7/10, worst 8/10, best 3/10   Location: bilateral back, but R > L  Description: Dull and cramping, sometimes sharp  Aggravating Factors: sitting, standing, bending, lifting and negotiating steps while carrying something.  Easing Factors: massage, relaxation, heating pad and rest     Radicular symptoms: tingling into R buttock and hip  Bowel and Bladder incontinence: none     Sleep is not disturbed. Sleeping position: back, side        Pts goals: "I don't want to feel any back pain."      Objective     Nakia received therapeutic exercises to develop strength, ROM, flexibility, posture and core stabilization for 30 minutes including:    Push/pull 3 x 3"  - L= push R=pull   Supine active hamstring stretches 10 x 10"  Gluteal set 10 x 10"  Bridging 10 x 10"   +PPT 20x3"  +SLR with TrA 10x ea        Nakia received the following manual " therapy techniques: Myofacial release and Soft tissue Mobilization were applied to the: 00 for 00 minutes, including:  NP      Nakia received hot pack for 00 minutes to NP.    Nakia received cold pack for 00 minutes to NP.      **Taken at Initial Evaluation**                 8/26/2020    Postural examination in standing:  - increased lumbar lordosis  - forward head  - forward shoulders  - R anterior rotated innominate, L rotated innominate     Postural examination in sitting:   - decreased lumbar lordosis  - forward head  - forward shoulders        Lumbar active range of motion in standing is:  Flexion 65 deg, pain with return   Extension 25 deg with pain   Left side bending 25 deg   Right side bending 28 deg   Left rotation Decreased 10 deg   Right rotation WFL         Flexibility testing:  - hamstrings:         B: tight, R: -40 deg, L: -40 deg             - gastrocnemius:   B: tight, R: neutral, L: 7 deg          - piriformis:            B: WNL       - quadriceps:         B: WNL  - hip flexors:           B: WFL  - hip adductors:      B: WNL  - IT Bands:             R: tight, decreased 50%, L:WFL     MMT R L   Hip flexion 5/5 5/5   Hip abduction 5/5 5/5   Hip extension 3+/5 4-/5   Hip ER 5/5 5/5   Hip IR 3+/5 4/5   Knee extension 5/5 5/5   Knee flexion 5/5 5/5   Ankle dorsiflexion 5/5 5/5   Ankle plantar flexion 5/5 5/5   Ankle inversion 3+/5 5/5   Ankle eversion 3+/5 5/5         Endurance is good.        Lumbar Special tests     SLR negative   Cross SLR negative   MARIANN positive   Prone Instability Test positive      Palpation: tenderness in B lumbar paraspinals and tenderness with PA joint play in lumbar spinal segments    Home Exercises Provided and Patient Education Provided     Education provided:   - Posture / HEP    Written Home Exercises Provided: Patient instructed to cont prior HEP.  Exercises were reviewed and Nakia was able to demonstrate them prior to the end of the session.  Nakia demonstrated  good  understanding of the education provided.     See EMR under Patient Instructions for exercises provided prior visit.    Assessment     Pt tolerated exercise well with no reports of increased pain beyond stated levles. Minimal exercises were performed secondary to pt arriving late/time constraints.  Muscle weakness in paraspinals/core musculature were notable during exercises. Pt was able to demonstrate improved core/transverse abdominis activation after cuing.  Will continue to focus on improving core/muslce strength and promoting improved posture.     Nakia is progressing well towards her goals.   Pt prognosis is Good.       Pt will continue to benefit from skilled outpatient physical therapy to address the deficits listed in the problem list box on initial evaluation, provide pt/family education and to maximize pt's level of independence in the home and community environment.     Pt's spiritual, cultural and educational needs considered and pt agreeable to plan of care and goals.     Anticipated Barriers for therapy: none    Goals:  Short Term Goals: 4 weeks   1. Independent with HEP.  2. Report decreased lumbar pain < or =  6/10 with adls such as sitting, standing, bending, lifting and negotiating steps while carrying something.  3. Increased MMT for B LE by 1 muscle grade to promote proper pelvic stability to decrease  lumbar pain < or =  6/10 with adls such as sitting, standing, bending, lifting and negotiating steps while carrying something.      Long Term Goals: 8 weeks   4. Report decreased lumbar pain < or =  3/10 with adls such as sitting, standing, bending, lifting and negotiating steps while carrying something.  5. Increased MMT for B LE to 5/5 muscle grade to promote proper pelvic stability to decrease lumbar pain < or =  3/10 with adls such as sitting, standing, bending, lifting and negotiating steps while carrying something.   6. Increased flexibility in B hamstrings to -20 deg with 90/90 test  to promote proper pelvic stability to decrease lumbar pain < or =  3/10 with adls such as sitting, standing, bending, lifting and negotiating steps while carrying something.  7. Increased flexibility in B IT bands to promote proper pelvic stability to decrease lumbar pain < or =  3/10 with adls such as sitting, standing, bending, lifting and negotiating steps while carrying something.    8. Patient to achieve CJ at least 20% < 40% impaired, limited or restricted level on the FOTO Outcomes Measurement System.      Plan     Certification Period/Plan of care expiration: 8/26/2020 to 10/21/2020.    Continue with current POC for further strengthening, flexibility, improve ROM and ADL performance. Will progress postural stabilization / core  there-ex as tolerated.        Outpatient Physical Therapy 2 times weekly for 8 weeks to include the following interventions: Manual Therapy, Moist Heat/ Ice, Neuromuscular Re-ed, Patient Education, Therapeutic Exercise and dry needling.       Andrew Mirza, PTA

## 2020-09-10 ENCOUNTER — CLINICAL SUPPORT (OUTPATIENT)
Dept: REHABILITATION | Facility: OTHER | Age: 35
End: 2020-09-10
Attending: ANESTHESIOLOGY
Payer: COMMERCIAL

## 2020-09-10 DIAGNOSIS — M62.81 MUSCLE WEAKNESS OF LOWER EXTREMITY: ICD-10-CM

## 2020-09-10 DIAGNOSIS — R29.3 POOR POSTURE: ICD-10-CM

## 2020-09-10 PROCEDURE — 97110 THERAPEUTIC EXERCISES: CPT | Mod: PN,CQ

## 2020-09-10 NOTE — PROGRESS NOTES
"    Physical Therapy Daily Treatment Note     Name: Nakia Jaime  Clinic Number: [id number]    Therapy Diagnosis:   Encounter Diagnoses   Name Primary?    Muscle weakness of lower extremity     Poor posture      Physician: Allen Moreno MD    Visit Date: 9/10/2020    Physician Orders: PT Eval and Treat   Medical Diagnosis: M54.42,M54.41 (ICD-10-CM) - Acute bilateral low back pain with bilateral sciatica  Evaluation Date: 8/26/2020  Authorization Period Expiration: 12/31/2020  Plan of Care Certification Period: 10/21/2020  Visit # / Visits authorized: 3/ 20     Time In: 0815  Time Out: 0901  Total Billable Time: 45 minutes  Precautions: Standard     Precautions: Standard         Subjective     Pt states feeling the same with no c/o pn or stiffness in low back.    She was compliant with home exercise program.  Response to previous treatment: no adverse effects  Functional change: no change    Prior Level of Function: I with amb and ADL  Current Level of Function: I with amb and ADL     Pain:  Current 7/10, worst 8/10, best 3/10   Location: bilateral back, but R > L  Description: Dull and cramping, sometimes sharp  Aggravating Factors: sitting, standing, bending, lifting and negotiating steps while carrying something.  Easing Factors: massage, relaxation, heating pad and rest     Radicular symptoms: tingling into R buttock and hip  Bowel and Bladder incontinence: none     Sleep is not disturbed. Sleeping position: back, side        Pts goals: "I don't want to feel any back pain."      Objective   Pt entered clinic in proper pelvic alignment.     Nakai received therapeutic exercises to develop strength, ROM, flexibility, posture and core stabilization for 45 minutes including:  TA activation 30 x 3 sec hold   +PPT 30x3"  Push/pull 3 x 3"  - L= push R=pull   Supine active hamstring stretches 10 x 10" B  Gluteal set 10 x 10"  Bridging 2 x 10 x 10"   Clamshells 3 x 10 B   +SLR with TrA 2 x 10 ea  Seated dowel " flexion w/ TA activation 2 x 10       Nakia received the following manual therapy techniques: Myofacial release and Soft tissue Mobilization were applied to the: 00 for 00 minutes, including:  NP      Nakia received hot pack for 00 minutes to NP.    Nakia received cold pack for 00 minutes to NP.    Education provided:   - Pt edu on proper exercise technique and ergonomics for work activities.      Written Home Exercises Provided: Patient instructed to cont prior HEP.  Exercises were reviewed and Nakia was able to demonstrate them prior to the end of the session.  Nakia demonstrated good  understanding of the education provided.     See EMR under Patient Instructions for exercises provided prior visit.    Assessment   Pt showed increased muscular endurance during therex.  Pt had no adverse effects from tx.  Pt cont to lack some core stability and glute strength.        Nakia is progressing well towards her goals.   Pt prognosis is Good.       Pt will continue to benefit from skilled outpatient physical therapy to address the deficits listed in the problem list box on initial evaluation, provide pt/family education and to maximize pt's level of independence in the home and community environment.     Pt's spiritual, cultural and educational needs considered and pt agreeable to plan of care and goals.     Anticipated Barriers for therapy: none    Goals:  Short Term Goals: 4 weeks   1. Independent with HEP.  2. Report decreased lumbar pain < or =  6/10 with adls such as sitting, standing, bending, lifting and negotiating steps while carrying something.  3. Increased MMT for B LE by 1 muscle grade to promote proper pelvic stability to decrease  lumbar pain < or =  6/10 with adls such as sitting, standing, bending, lifting and negotiating steps while carrying something.      Long Term Goals: 8 weeks   4. Report decreased lumbar pain < or =  3/10 with adls such as sitting, standing, bending, lifting and  negotiating steps while carrying something.  5. Increased MMT for B LE to 5/5 muscle grade to promote proper pelvic stability to decrease lumbar pain < or =  3/10 with adls such as sitting, standing, bending, lifting and negotiating steps while carrying something.   6. Increased flexibility in B hamstrings to -20 deg with 90/90 test to promote proper pelvic stability to decrease lumbar pain < or =  3/10 with adls such as sitting, standing, bending, lifting and negotiating steps while carrying something.  7. Increased flexibility in B IT bands to promote proper pelvic stability to decrease lumbar pain < or =  3/10 with adls such as sitting, standing, bending, lifting and negotiating steps while carrying something.    8. Patient to achieve CJ at least 20% < 40% impaired, limited or restricted level on the FOTO Outcomes Measurement System.      Plan     Cont to progress towards goals set by PT focusing on glute and core strength.      Swapnil Scott, PTA

## 2020-09-14 ENCOUNTER — CLINICAL SUPPORT (OUTPATIENT)
Dept: REHABILITATION | Facility: OTHER | Age: 35
End: 2020-09-14
Attending: ANESTHESIOLOGY
Payer: COMMERCIAL

## 2020-09-14 DIAGNOSIS — R29.3 POOR POSTURE: ICD-10-CM

## 2020-09-14 DIAGNOSIS — M62.81 MUSCLE WEAKNESS OF LOWER EXTREMITY: ICD-10-CM

## 2020-09-14 PROCEDURE — 97110 THERAPEUTIC EXERCISES: CPT | Mod: PN

## 2020-09-14 NOTE — PROGRESS NOTES
"    Physical Therapy Daily Treatment Note     Name: Nakia Jaime  Clinic Number: [id number]    Therapy Diagnosis:   Encounter Diagnoses   Name Primary?    Muscle weakness of lower extremity     Poor posture      Physician: Allen Moreno MD    Visit Date: 9/14/2020    Physician Orders: PT Eval and Treat   Medical Diagnosis: M54.42,M54.41 (ICD-10-CM) - Acute bilateral low back pain with bilateral sciatica  Evaluation Date: 8/26/2020  Authorization Period Expiration: 12/31/2020  Plan of Care Certification Period: 10/21/2020  Visit # / Visits authorized: 4/ 20     Time In: 1:46 pm  Time Out: 2:31 pm  Total Billable Time: 45 minutes     Precautions: Standard         Subjective     Pt states she is feeling okay today.  She feels the most pain in the morning when she gets up but finds that it eases up when she gets moving.   She was compliant with home exercise program.  Response to previous treatment: no adverse effects  Functional change: no change       Pain:  Current 5/10, worst   Location: bilateral back, but R > L        Objective   Pt entered clinic in proper pelvic alignment.     Nakia received therapeutic exercises to develop strength, ROM, flexibility, posture and core stabilization for 45 minutes including:    TA activation 30 x 3 sec hold   PPT 30x3"  Bridging with PPT and OTB for iso hip abduction 2 x 10   Clamshells with OTB 2 x 10 B   Supine active hamstring stretches 10 x 10" B  +reverse clamshells 2x 10 with 1# weight  +lumbar rotation stretch 5x 10 seconds each  + single knee fall outs in supine 10x 5 second hold each        Education provided:   - Pt edu on proper exercise technique and ergonomics for work activities.      Written Home Exercises Provided: Patient instructed to cont prior HEP.  Exercises were reviewed and Nakia was able to demonstrate them prior to the end of the session.  Nakia demonstrated good  understanding of the education provided.     See EMR under Patient Instructions " for exercises provided prior visit.    Assessment     Patient tolerated treatment session with new exercises and progressions well today.  Added resistance to clamshells and focused on slow, controlled performance of exercises today for improved stability.  Patient was cued for proper core activation for exercise performance. Continue with core strengthening.       Nakia is progressing well towards her goals.   Pt prognosis is Good.       Pt will continue to benefit from skilled outpatient physical therapy to address the deficits listed in the problem list box on initial evaluation, provide pt/family education and to maximize pt's level of independence in the home and community environment.     Pt's spiritual, cultural and educational needs considered and pt agreeable to plan of care and goals.     Anticipated Barriers for therapy: none    Goals:  Short Term Goals: 4 weeks   1. Independent with HEP.  2. Report decreased lumbar pain < or =  6/10 with adls such as sitting, standing, bending, lifting and negotiating steps while carrying something.  3. Increased MMT for B LE by 1 muscle grade to promote proper pelvic stability to decrease  lumbar pain < or =  6/10 with adls such as sitting, standing, bending, lifting and negotiating steps while carrying something.      Long Term Goals: 8 weeks   4. Report decreased lumbar pain < or =  3/10 with adls such as sitting, standing, bending, lifting and negotiating steps while carrying something.  5. Increased MMT for B LE to 5/5 muscle grade to promote proper pelvic stability to decrease lumbar pain < or =  3/10 with adls such as sitting, standing, bending, lifting and negotiating steps while carrying something.   6. Increased flexibility in B hamstrings to -20 deg with 90/90 test to promote proper pelvic stability to decrease lumbar pain < or =  3/10 with adls such as sitting, standing, bending, lifting and negotiating steps while carrying something.  7. Increased  flexibility in B IT bands to promote proper pelvic stability to decrease lumbar pain < or =  3/10 with adls such as sitting, standing, bending, lifting and negotiating steps while carrying something.    8. Patient to achieve CJ at least 20% < 40% impaired, limited or restricted level on the FOTO Outcomes Measurement System.      Plan     Continue with established PT POC and progress per pt tolerance.        Vielka Michael, PT

## 2020-09-17 ENCOUNTER — CLINICAL SUPPORT (OUTPATIENT)
Dept: REHABILITATION | Facility: OTHER | Age: 35
End: 2020-09-17
Attending: ANESTHESIOLOGY
Payer: COMMERCIAL

## 2020-09-17 ENCOUNTER — PATIENT OUTREACH (OUTPATIENT)
Dept: ADMINISTRATIVE | Facility: OTHER | Age: 35
End: 2020-09-17

## 2020-09-17 DIAGNOSIS — M62.81 MUSCLE WEAKNESS OF LOWER EXTREMITY: ICD-10-CM

## 2020-09-17 DIAGNOSIS — R29.3 POOR POSTURE: ICD-10-CM

## 2020-09-17 PROCEDURE — 97110 THERAPEUTIC EXERCISES: CPT | Mod: PN,CQ

## 2020-09-17 NOTE — PROGRESS NOTES
"    Physical Therapy Daily Treatment Note     Name: Nakia Jaime  Clinic Number: [id number]    Therapy Diagnosis:   Encounter Diagnoses   Name Primary?    Muscle weakness of lower extremity     Poor posture      Physician: Allen Moreno MD    Visit Date: 9/17/2020    Physician Orders: PT Eval and Treat   Medical Diagnosis: M54.42,M54.41 (ICD-10-CM) - Acute bilateral low back pain with bilateral sciatica  Evaluation Date: 8/26/2020  Authorization Period Expiration: 12/31/2020  Plan of Care Certification Period: 10/21/2020  Visit # / Visits authorized: 5/ 20     Time In: 1326  Time Out: 1413   Total Billable Time: 47 minutes     Precautions: Standard         Subjective     Pt reports w/ no c/o pn in low back.  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  Pt states feeling " a bubble like pressure around lumbar vertebra"  when performing pelvic tilts.    She was compliant with home exercise program.  Response to previous treatment: no adverse effects  Functional change: no change       Pain:  Current 0/10  Location: bilateral back, but R > L      Objective   Pt entered clinic in proper pelvic alignment.     Nakia received therapeutic exercises to develop strength, ROM, flexibility, posture and core stabilization for 47 minutes including:    TA activation 30 x 5 sec hold   PPT 30x3"  Supine active hamstring stretches 10 x 10" B  Bridging with PPT and OTB for iso hip abduction 2 x 15 ( unable to extend hips completely w/out pn and popping)   +lumbar rotation stretch 5x 10 seconds each  Clamshells with OTB 2 x 15 B   HL TA march 2 x 30 sec   +reverse clamshells 2x 15 with 1# weight  + single knee fall outs in supine 10x 5 second hold each      Education provided:   - Pt edu on proper exercise technique.     Written Home Exercises Provided: Patient instructed to cont prior HEP.  Exercises were reviewed and Nakia was able to demonstrate them prior to the end of " the session.  Nakia demonstrated good  understanding of the education provided.     See EMR under Patient Instructions for exercises provided prior visit.    Assessment   Pt showed increased glute strength during therex.  Pt cont to lack some core stability and LE strength.  Pt matt tx well.  Pn level remained same prior to and after tx session.        Nakia is progressing well towards her goals.   Pt prognosis is Good.       Pt will continue to benefit from skilled outpatient physical therapy to address the deficits listed in the problem list box on initial evaluation, provide pt/family education and to maximize pt's level of independence in the home and community environment.     Pt's spiritual, cultural and educational needs considered and pt agreeable to plan of care and goals.     Anticipated Barriers for therapy: none    Goals:  Short Term Goals: 4 weeks   1. Independent with HEP.  2. Report decreased lumbar pain < or =  6/10 with adls such as sitting, standing, bending, lifting and negotiating steps while carrying something.  3. Increased MMT for B LE by 1 muscle grade to promote proper pelvic stability to decrease  lumbar pain < or =  6/10 with adls such as sitting, standing, bending, lifting and negotiating steps while carrying something.      Long Term Goals: 8 weeks   4. Report decreased lumbar pain < or =  3/10 with adls such as sitting, standing, bending, lifting and negotiating steps while carrying something.  5. Increased MMT for B LE to 5/5 muscle grade to promote proper pelvic stability to decrease lumbar pain < or =  3/10 with adls such as sitting, standing, bending, lifting and negotiating steps while carrying something.   6. Increased flexibility in B hamstrings to -20 deg with 90/90 test to promote proper pelvic stability to decrease lumbar pain < or =  3/10 with adls such as sitting, standing, bending, lifting and negotiating steps while carrying something.  7. Increased flexibility in B IT  bands to promote proper pelvic stability to decrease lumbar pain < or =  3/10 with adls such as sitting, standing, bending, lifting and negotiating steps while carrying something.    8. Patient to achieve CJ at least 20% < 40% impaired, limited or restricted level on the FOTO Outcomes Measurement System.      Plan     Cont to progress towards goals set by PT focusing on core and hip strength.        Swapnil Scott, PTA

## 2020-09-17 NOTE — PROGRESS NOTES
Health Maintenance Due   Topic Date Due    Hepatitis C Screening  1985    HIV Screening  04/24/2002    TETANUS VACCINE  04/24/2005    Cervical Cancer Screening  04/24/2008    Influenza Vaccine (1) 08/01/2020     Updates were requested from care everywhere.  Chart was reviewed for overdue Proactive [facility] Encounters ([facility]) topics (CRS, Breast Cancer Screening, Eye exam)  Health Maintenance has been updated.  LIN[facility] immunization registry triggered.  Immunizations were reconciled.

## 2020-09-18 ENCOUNTER — TELEPHONE (OUTPATIENT)
Dept: PAIN MEDICINE | Facility: CLINIC | Age: 35
End: 2020-09-18

## 2020-09-18 NOTE — TELEPHONE ENCOUNTER
Staff spoke with patient regarding appointment 9/21/20 at 1 pm with [facility] as in office visit and to inform patient that appointment is located on 8th floor [address] patient verbalized understanding.

## 2020-09-21 ENCOUNTER — OFFICE VISIT (OUTPATIENT)
Dept: PAIN MEDICINE | Facility: CLINIC | Age: 35
End: 2020-09-21
Payer: COMMERCIAL

## 2020-09-21 ENCOUNTER — CLINICAL SUPPORT (OUTPATIENT)
Dept: REHABILITATION | Facility: OTHER | Age: 35
End: 2020-09-21
Attending: ANESTHESIOLOGY
Payer: COMMERCIAL

## 2020-09-21 VITALS
SYSTOLIC BLOOD PRESSURE: 108 MMHG | OXYGEN SATURATION: 100 % | BODY MASS INDEX: 26.37 KG/M2 | RESPIRATION RATE: 18 BRPM | DIASTOLIC BLOOD PRESSURE: 59 MMHG | WEIGHT: 143.31 LBS | HEIGHT: 62 IN | TEMPERATURE: 97 F | HEART RATE: 68 BPM

## 2020-09-21 DIAGNOSIS — M54.9 DORSALGIA, UNSPECIFIED: ICD-10-CM

## 2020-09-21 DIAGNOSIS — M62.81 MUSCLE WEAKNESS OF LOWER EXTREMITY: ICD-10-CM

## 2020-09-21 DIAGNOSIS — R29.3 POOR POSTURE: ICD-10-CM

## 2020-09-21 DIAGNOSIS — M47.816 LUMBAR SPONDYLOSIS: Primary | ICD-10-CM

## 2020-09-21 DIAGNOSIS — M43.17 SPONDYLOLISTHESIS OF LUMBOSACRAL REGION: ICD-10-CM

## 2020-09-21 PROCEDURE — 99999 PR PBB SHADOW E&M-EST. PATIENT-LVL IV: ICD-10-PCS | Mod: PBBFAC,,, | Performed by: NURSE PRACTITIONER

## 2020-09-21 PROCEDURE — 99213 PR OFFICE/OUTPT VISIT, EST, LEVL III, 20-29 MIN: ICD-10-PCS | Mod: S$GLB,,, | Performed by: NURSE PRACTITIONER

## 2020-09-21 PROCEDURE — 3008F BODY MASS INDEX DOCD: CPT | Mod: CPTII,S$GLB,, | Performed by: NURSE PRACTITIONER

## 2020-09-21 PROCEDURE — 99999 PR PBB SHADOW E&M-EST. PATIENT-LVL IV: CPT | Mod: PBBFAC,,, | Performed by: NURSE PRACTITIONER

## 2020-09-21 PROCEDURE — 3008F PR BODY MASS INDEX (BMI) DOCUMENTED: ICD-10-PCS | Mod: CPTII,S$GLB,, | Performed by: NURSE PRACTITIONER

## 2020-09-21 PROCEDURE — 99213 OFFICE O/P EST LOW 20 MIN: CPT | Mod: S$GLB,,, | Performed by: NURSE PRACTITIONER

## 2020-09-21 PROCEDURE — 97110 THERAPEUTIC EXERCISES: CPT | Mod: PN

## 2020-09-21 NOTE — PROGRESS NOTES
Chronic Pain - New Consult    Referring Physician: Maxwell Fischer*    Chief Complaint: Low back pain     SUBJECTIVE:    Interval History 9/21/2020:  The patient presents for follow up of lower back pain. Pt denies radicular pains into legs. She is undergoing PT, taking celebrex 200mg, Gabapentin 300mg and flexeril with benefit without adverse side effects. Pt states no pain with valsalva and denies a.m. stiffness. The patient denies myelopathic symptoms such as handwriting changes or difficulty with buttons/coins/keys. Denies perineal paresthesias, bowel/bladder dysfunction.      HPI:  Nakia Jaime presents to the clinic for the evaluation of low back pain. The pain started 2 weeks ago and symptoms have been worsening.The pain is located in the low back area and radiates to the bilateral groins.  The pain is described as aching, sharp and shooting and is rated as 7/10. The pain is rated with a score of  5/10 on the BEST day and a score of 8/10 on the WORST day.  Symptoms interfere with work. The pain is exacerbated by Sitting, Bending and Coughing/Sneezing.  The pain is mitigated by medications. The patient reports 7 hours of uninterrupted sleep per night. She has tried stretching and advil with some relief.    Patient denies night fever/night sweats, urinary incontinence, bowel incontinence, significant weight loss, significant motor weakness and loss of sensations.    Physical Therapy/Home Exercise: yes for her ankle    Pain Disability Index Review:  Last 3 PDI Scores 8/26/2020   Pain Disability Index (PDI) 21       Pain Medications:  Advil     report:  Reviewed and consistent with medication use as prescribed.    Pain Procedures: none    Imaging: none    Past Medical History:   Diagnosis Date    COPD (chronic obstructive pulmonary disease)      Past Surgical History:   Procedure Laterality Date    crown tooth      LUNG BIOPSY      drained lungs of fluid     Social History     Socioeconomic  History    Marital status: Single     Spouse name: Not on file    Number of children: Not on file    Years of education: Not on file    Highest education level: Not on file   Occupational History    Not on file   Social Needs    Financial resource strain: Not hard at all    Food insecurity     Worry: Never true     Inability: Never true    Transportation needs     Medical: Patient refused     Non-medical: No   Tobacco Use    Smoking status: Never Smoker    Smokeless tobacco: Never Used   Substance and Sexual Activity    Alcohol use: Yes     Frequency: 2-4 times a month     Drinks per session: 1 or 2     Binge frequency: Less than monthly     Comment: socially    Drug use: No    Sexual activity: Yes   Lifestyle    Physical activity     Days per week: 5 days     Minutes per session: 60 min    Stress: To some extent   Relationships    Social connections     Talks on phone: Twice a week     Gets together: Never     Attends Scientologist service: Not on file     Active member of club or organization: No     Attends meetings of clubs or organizations: Never     Relationship status: Living with partner   Other Topics Concern    Not on file   Social History Narrative    Not on file     Family History   Problem Relation Age of Onset    Hypertension Father     Diabetes Father     No Known Problems Mother        Review of patient's allergies indicates:  No Known Allergies    Current Outpatient Medications   Medication Sig    celecoxib (CELEBREX) 200 MG capsule Take 1 capsule (200 mg total) by mouth once daily.    HYDROcodone-acetaminophen (NORCO) 7.5-325 mg per tablet TK 1 T PO  Q 8 H PRN P    ibuprofen (ADVIL,MOTRIN) 200 MG tablet Take 200 mg by mouth.    celecoxib (CELEBREX) 200 MG capsule Take 1 capsule (200 mg total) by mouth 2 (two) times daily as needed for Pain.    cyclobenzaprine (FLEXERIL) 5 MG tablet Take 1 tablet (5 mg total) by mouth 3 (three) times daily as needed for Muscle spasms.     "gabapentin (NEURONTIN) 300 MG capsule Take 1 capsule (300 mg total) by mouth 3 (three) times daily. One at night for 7 days, then 2 a day for 7 days, then 3 a day    valACYclovir (VALTREX) 500 MG tablet Take 1 tablet (500 mg total) by mouth 2 (two) times daily. (Patient not taking: Reported on 8/26/2020)     No current facility-administered medications for this visit.        REVIEW OF SYSTEMS:    GENERAL:  No weight loss, malaise or fevers.  HEENT:  Negative for frequent or significant headaches.  NECK:  Negative for lumps, goiter, pain and significant neck swelling.  RESPIRATORY:  Negative for cough, wheezing or shortness of breath.  CARDIOVASCULAR:  Negative for chest pain, leg swelling or palpitations.  GI:  Negative for abdominal discomfort, blood in stools or black stools or change in bowel habits.  MUSCULOSKELETAL:  + Lower back pain  SKIN:  Negative for lesions, rash, and itching.  PSYCH:  Negative for  mood disorder and recent psychosocial stressors. sleep disturbance  HEMATOLOGY/LYMPHOLOGY:  Negative for prolonged bleeding, bruising easily or swollen nodes.  NEURO:   No history of headaches, syncope, paralysis, seizures or tremors.  All other reviewed and negative other than HPI.    OBJECTIVE:    /63 (BP Location: Left arm, Patient Position: Sitting)   Pulse (!) 54   Temp 98 °F (36.7 °C)   Ht 5' 2" (1.575 m)   Wt 65.3 kg (143 lb 15.4 oz)   BMI 26.33 kg/m²     PHYSICAL EXAMINATION:    General appearance: Well appearing, in no acute distress, alert and oriented x3.  Psych:  Mood and affect appropriate.  Skin: Skin color, texture, turgor normal, no rashes or lesions, in both upper and lower body.  Head/face:  Normocephalic, atraumatic. No palpable lymph nodes.  Neck: No pain to palpation over the cervical paraspinous muscles. Spurling Negative. No pain with neck flexion, extension, or lateral flexion.   Cor: Normal Rate  Pulm: normal effort  Back: Straight leg raising in the sitting and supine " positions is negative to radicular pain. Pain to palpation over the lumbar spine paraspinal area. Positive facet loading on the right which is mild,  Normal range of motion without pain reproduction.  Extremities: Peripheral joint ROM is full and pain free without obvious instability or laxity in all four extremities. No deformities, edema, or skin discoloration. Good capillary refill.  Musculoskeletal: Shoulder, hip, and knee provocative maneuvers are negative. Positive MARIANN on the left.  Bilateral upper and lower extremity strength is normal and symmetric Except for Decreased EHL on right.  No atrophy or tone abnormalities are noted.   Neuro: Bilateral lower extremity coordination and muscle stretch reflexes are physiologic and symmetric.  Plantar response are downgoing. No loss of sensation is noted.  Gait: normal.    ASSESSMENT: 35 y.o. year old female with low back pain, consistent with     1. Lumbar spondylosis  Ambulatory referral/consult to Physical/Occupational Therapy   2. Acute bilateral low back pain with bilateral sciatica  Ambulatory referral/consult to Pain Clinic    Ambulatory referral/consult to Physical/Occupational Therapy   3. Dorsalgia, unspecified  X-Ray Lumbar Complete With Flex And Ext         PLAN:     - Prior records and Xray reviewed with patient.   - Continue Neurontin 300mg gradually to three times a day to help with radicular pain.  - Continue Flexeril 5 mg tid prn to help with pain.  - Continue Celebrex 200 mg twice a day to help with patients pain.  - She has had benefit from Medications and Physical Therapy but still having pain  - Will order MRI    - RTC (Will call with results to consider interventional options)  - Counseled patient regarding the importance of activity modification and physical therapy.    The above plan and management options were discussed at length with patient. Patient is in agreement with the above and verbalized understanding. It will be communicated with  the referring physician via electronic record, fax, or mail.    JOYA Cline  9/21/2020

## 2020-09-21 NOTE — PROGRESS NOTES
"  Physical Therapy Daily Treatment Note     Name: Nakia Jaime  Clinic Number: [id number]    Therapy Diagnosis:   Encounter Diagnoses   Name Primary?    Muscle weakness of lower extremity     Poor posture      Physician: Allen Moreno MD    Visit Date: 9/21/2020    Physician Orders: PT Eval and Treat   Medical Diagnosis: M54.42,M54.41 (ICD-10-CM) - Acute bilateral low back pain with bilateral sciatica  Evaluation Date: 8/26/2020  Authorization Period Expiration: 12/31/2020  Plan of Care Certification Period: 10/21/2020  Visit # / Visits authorized: 6/ 20     Time In: 3:30 PM  Time Out: 4:03 PM  Total Billable Time: 33 minutes     Precautions: Standard       Subjective     States her back is feeling better, but if she moves a certain way, she has pain. Started to run up the steps and felt pulling in her low back    She was compliant with home exercise program.  Response to previous treatment: no adverse effects  Functional change: no change       Pain:  Current 4/10  Location: R low back      Objective   Pt entered clinic in proper pelvic alignment.     Nakia received therapeutic exercises to develop strength, ROM, flexibility, posture and core stabilization for 33 minutes including:    Push/pull 3 x 3"  Supine hamstring stretches 3 x 30"  Piriformis stretch 3 x 30" (figure 4 technique)  Bridging 10 x 10"   lumbar rotation stretch 10 x 10 seconds each  Side lying clamshells 10 x 10"  Side lying hip abd 10 x 5"  +prone hip abd 10 x 5"      Education provided:   No new HEP given today    Written Home Exercises Provided: Patient instructed to cont prior HEP.  Exercises were reviewed and Nakia was able to demonstrate them prior to the end of the session.  Nakia demonstrated good  understanding of the education provided.     See EMR under Patient Instructions for exercises provided prior visit.    Assessment     Continued with core strengthening and pelvic stabilization exercises. Will progress to quadruped " exercises next session.    Nakia is progressing well towards her goals.   Pt prognosis is Good.       Pt will continue to benefit from skilled outpatient physical therapy to address the deficits listed in the problem list box on initial evaluation, provide pt/family education and to maximize pt's level of independence in the home and community environment.     Pt's spiritual, cultural and educational needs considered and pt agreeable to plan of care and goals.     Anticipated Barriers for therapy: none    Goals:  Short Term Goals: 4 weeks   1. Independent with HEP. (in progress)  2. Report decreased lumbar pain < or =  6/10 with adls such as sitting, standing, bending, lifting and negotiating steps while carrying something. (progressing)  3. Increased MMT for B LE by 1 muscle grade to promote proper pelvic stability to decrease  lumbar pain < or =  6/10 with adls such as sitting, standing, bending, lifting and negotiating steps while carrying something. (progressing, not met)     Long Term Goals: 8 weeks   4. Report decreased lumbar pain < or =  3/10 with adls such as sitting, standing, bending, lifting and negotiating steps while carrying something. (progressing, not met)  5. Increased MMT for B LE to 5/5 muscle grade to promote proper pelvic stability to decrease lumbar pain < or =  3/10 with adls such as sitting, standing, bending, lifting and negotiating steps while carrying something.  (progressing, not met)  6. Increased flexibility in B hamstrings to -20 deg with 90/90 test to promote proper pelvic stability to decrease lumbar pain < or =  3/10 with adls such as sitting, standing, bending, lifting and negotiating steps while carrying something. (progressing, not met)  7. Increased flexibility in B IT bands to promote proper pelvic stability to decrease lumbar pain < or =  3/10 with adls such as sitting, standing, bending, lifting and negotiating steps while carrying something.   (progressing, not  met)  8. Patient to achieve CJ at least 20% < 40% impaired, limited or restricted level on the FOTO Outcomes Measurement System. (progressing, not met)      Plan     Cont to progress towards goals set by PT focusing on core and hip strength.        Maxwell Brown, PT

## 2020-09-24 ENCOUNTER — HOSPITAL ENCOUNTER (OUTPATIENT)
Dept: RADIOLOGY | Facility: OTHER | Age: 35
Discharge: HOME | End: 2020-09-24
Attending: NURSE PRACTITIONER
Payer: COMMERCIAL

## 2020-09-24 ENCOUNTER — CLINICAL SUPPORT (OUTPATIENT)
Dept: REHABILITATION | Facility: OTHER | Age: 35
End: 2020-09-24
Attending: ANESTHESIOLOGY
Payer: COMMERCIAL

## 2020-09-24 DIAGNOSIS — R29.3 POOR POSTURE: ICD-10-CM

## 2020-09-24 DIAGNOSIS — M62.81 MUSCLE WEAKNESS OF LOWER EXTREMITY: ICD-10-CM

## 2020-09-24 DIAGNOSIS — M43.17 SPONDYLOLISTHESIS OF LUMBOSACRAL REGION: ICD-10-CM

## 2020-09-24 PROCEDURE — 72148 MRI LUMBAR SPINE W/O DYE: CPT | Mod: TC

## 2020-09-24 PROCEDURE — 72148 MRI LUMBAR SPINE W/O DYE: CPT | Mod: 26,,, | Performed by: RADIOLOGY

## 2020-09-24 PROCEDURE — 72148 MRI LUMBAR SPINE WITHOUT CONTRAST: ICD-10-PCS | Mod: 26,,, | Performed by: RADIOLOGY

## 2020-09-24 PROCEDURE — 97110 THERAPEUTIC EXERCISES: CPT | Mod: PN,CQ

## 2020-09-24 NOTE — PROGRESS NOTES
"  Physical Therapy Daily Treatment Note     Name: Nakia Jaime  Clinic Number: [id number]    Therapy Diagnosis:   Encounter Diagnoses   Name Primary?    Muscle weakness of lower extremity     Poor posture      Physician: Allen Moreno MD    Visit Date: 9/24/2020    Physician Orders: PT Eval and Treat   Medical Diagnosis: M54.42,M54.41 (ICD-10-CM) - Acute bilateral low back pain with bilateral sciatica  Evaluation Date: 8/26/2020  Authorization Period Expiration: 12/31/2020  Plan of Care Certification Period: 10/21/2020  Visit # / Visits authorized: 7/ 20     Time In: 0800  Time Out: 0845  Total Billable Time: 45 minutes     Precautions: Standard       Subjective     States her back is sore today. States she has been having a little more pain than she did the other day. States she feels like it stems from running up her stairs the other day.     She was compliant with home exercise program.  Response to previous treatment: sore  Functional change: no change       Pain:  Current 5/10  Location: R low back      Objective   Pt entered clinic in proper pelvic alignment.     Nakia received therapeutic exercises to develop strength, ROM, flexibility, posture and core stabilization for 45 minutes including:    Push/pull 3 x 3"  Supine hamstring stretches 3 x 30"  Piriformis stretch 3 x 30" (figure 4 technique)  Bridging 10 x 10"   lumbar rotation stretch 10 x 10 seconds each  Side lying clamshells 10 x 10"   Side lying hip abd 10 x 5"   prone hip abd 10 x 5"  +Anti-rotations otb 10x  +add next visit - Bird dogs          Education provided:    No new HEP given today    Written Home Exercises Provided: Patient instructed to cont prior HEP.  Exercises were reviewed and Nakia was able to demonstrate them prior to the end of the session.  Nakia demonstrated good  understanding of the education provided.     See EMR under Patient Instructions for exercises provided prior visit.    Assessment     Pt appeared slightly " more guarded this visit in her posture during clinic ambulation and with there-ex. Quadruped exercise were deferred this visit secondary as not to exacerbate symptoms/pain. Core weakness continues to be present in paraspinals however, some improvements are noted.      Nakia is progressing well towards her goals.   Pt prognosis is Good.       Pt will continue to benefit from skilled outpatient physical therapy to address the deficits listed in the problem list box on initial evaluation, provide pt/family education and to maximize pt's level of independence in the home and community environment.     Pt's spiritual, cultural and educational needs considered and pt agreeable to plan of care and goals.     Anticipated Barriers for therapy: none    Goals:  Short Term Goals: 4 weeks   1. Independent with HEP. (in progress)  2. Report decreased lumbar pain < or =  6/10 with adls such as sitting, standing, bending, lifting and negotiating steps while carrying something. (progressing)  3. Increased MMT for B LE by 1 muscle grade to promote proper pelvic stability to decrease  lumbar pain < or =  6/10 with adls such as sitting, standing, bending, lifting and negotiating steps while carrying something. (progressing, not met)     Long Term Goals: 8 weeks   4. Report decreased lumbar pain < or =  3/10 with adls such as sitting, standing, bending, lifting and negotiating steps while carrying something. (progressing, not met)  5. Increased MMT for B LE to 5/5 muscle grade to promote proper pelvic stability to decrease lumbar pain < or =  3/10 with adls such as sitting, standing, bending, lifting and negotiating steps while carrying something.  (progressing, not met)  6. Increased flexibility in B hamstrings to -20 deg with 90/90 test to promote proper pelvic stability to decrease lumbar pain < or =  3/10 with adls such as sitting, standing, bending, lifting and negotiating steps while carrying something. (progressing, not  met)  7. Increased flexibility in B IT bands to promote proper pelvic stability to decrease lumbar pain < or =  3/10 with adls such as sitting, standing, bending, lifting and negotiating steps while carrying something.   (progressing, not met)  8. Patient to achieve CJ at least 20% < 40% impaired, limited or restricted level on the FOTO Outcomes Measurement System. (progressing, not met)      Plan     Cont to progress towards goals set by PT focusing on core and hip strength.        Andrew Mirza, PTA

## 2020-09-28 ENCOUNTER — CLINICAL SUPPORT (OUTPATIENT)
Dept: REHABILITATION | Facility: OTHER | Age: 35
End: 2020-09-28
Attending: ANESTHESIOLOGY
Payer: COMMERCIAL

## 2020-09-28 DIAGNOSIS — M62.81 MUSCLE WEAKNESS OF LOWER EXTREMITY: ICD-10-CM

## 2020-09-28 DIAGNOSIS — R29.3 POOR POSTURE: ICD-10-CM

## 2020-09-28 PROCEDURE — 97110 THERAPEUTIC EXERCISES: CPT | Mod: PN

## 2020-09-28 PROCEDURE — 97140 MANUAL THERAPY 1/> REGIONS: CPT | Mod: PN

## 2020-09-28 NOTE — PROGRESS NOTES
"  Physical Therapy Daily Treatment Note     Name: Nakia Jaime  Clinic Number: [id number]    Therapy Diagnosis:   Encounter Diagnoses   Name Primary?    Muscle weakness of lower extremity     Poor posture      Physician: Allen Moreno MD    Visit Date: 9/28/2020    Physician Orders: PT Eval and Treat   Medical Diagnosis: M54.42,M54.41 (ICD-10-CM) - Acute bilateral low back pain with bilateral sciatica  Evaluation Date: 8/26/2020  Authorization Period Expiration: 12/31/2020  Plan of Care Certification Period: 10/21/2020  Visit # / Visits authorized: 8/ 20     Time In: 3:30 PM  Time Out: 4:10 PM  Total Billable Time: 30 minutes     Precautions: Standard       Subjective   reports in creased back pain this afternoon. States she has issues with her R LE buckling, particularly on days that she is working and she is stand more.    She was compliant with home exercise program.  Response to previous treatment: did well  Functional change: no change       Pain:  Current 5-6/10  Location: R low back      Objective   Pt entered clinic in proper pelvic alignment.     Nakia received therapeutic exercises to develop strength, ROM, flexibility, posture and core stabilization for 10 minutes including:    Push/pull 3 x 3"  Supine hamstring stretches 3 x 30"  Piriformis stretch 3 x 30" (figure 4 technique)  DKTC x 20" (increased pain in R LE)  Bridging 10 x 10"   lumbar rotation stretch 10 x 10 seconds each  Side lying clamshells 10 x 10"   Side lying hip abd 10 x 5"   prone hip abd 10 x 5"  +Anti-rotations otb 10x  +add next visit - Bird dogs      Nakia  received the following manual therapy techniques: dry nedling were applied to the: low back for 20 minutes, including:     Application of TDN: Pt educated on benefits and potential side effects of dry needling. Educated pt on benefits, precautions, side effects following TDN. Educated pt to use heat following treatment sessions if pt is experiencing pain or soreness. Pt " verbalized good understanding of education.  Pt signed written consent to dry needling. Pt gave verbal consent for DN    Pt received dry needling to the below listed muscles using 60 mm needles.  B L4, L5 paraspinals    Winding performed every 5 minutes during treatment.    Nakia received moist heat to low back x 10 minutes following dry needling today.    Education provided:    Discussed the use of heat tonight if pt experiences needle site pain.    Written Home Exercises Provided: Patient instructed to cont prior HEP.  Exercises were reviewed and Nakia was able to demonstrate them prior to the end of the session.  Nakia demonstrated good  understanding of the education provided.     See EMR under Patient Instructions for exercises provided prior visit.    Assessment     Initiated dry needling to low back today to decrease low back pain. Good soft tissue response to dry needling evident by increased grasp with unilateral winding at all insertion points. Winding performed every 5 minutes during treatment. No adverse effects following treatment.      Nakia is progressing well towards her goals.   Pt prognosis is Good.       Pt will continue to benefit from skilled outpatient physical therapy to address the deficits listed in the problem list box on initial evaluation, provide pt/family education and to maximize pt's level of independence in the home and community environment.     Pt's spiritual, cultural and educational needs considered and pt agreeable to plan of care and goals.     Anticipated Barriers for therapy: none    Goals:  Short Term Goals: 4 weeks   1. Independent with HEP. (in progress)  2. Report decreased lumbar pain < or =  6/10 with adls such as sitting, standing, bending, lifting and negotiating steps while carrying something. (progressing)  3. Increased MMT for B LE by 1 muscle grade to promote proper pelvic stability to decrease  lumbar pain < or =  6/10 with adls such as sitting, standing,  bending, lifting and negotiating steps while carrying something. (progressing, not met)     Long Term Goals: 8 weeks   4. Report decreased lumbar pain < or =  3/10 with adls such as sitting, standing, bending, lifting and negotiating steps while carrying something. (progressing, not met)  5. Increased MMT for B LE to 5/5 muscle grade to promote proper pelvic stability to decrease lumbar pain < or =  3/10 with adls such as sitting, standing, bending, lifting and negotiating steps while carrying something.  (progressing, not met)  6. Increased flexibility in B hamstrings to -20 deg with 90/90 test to promote proper pelvic stability to decrease lumbar pain < or =  3/10 with adls such as sitting, standing, bending, lifting and negotiating steps while carrying something. (progressing, not met)  7. Increased flexibility in B IT bands to promote proper pelvic stability to decrease lumbar pain < or =  3/10 with adls such as sitting, standing, bending, lifting and negotiating steps while carrying something.   (progressing, not met)  8. Patient to achieve CJ at least 20% < 40% impaired, limited or restricted level on the FOTO Outcomes Measurement System. (progressing, not met)      Plan     Cont to progress towards goals set by PT focusing on core and hip strength.        Maxwell Brown, PT

## 2020-10-01 ENCOUNTER — CLINICAL SUPPORT (OUTPATIENT)
Dept: REHABILITATION | Facility: OTHER | Age: 35
End: 2020-10-01
Attending: ANESTHESIOLOGY
Payer: COMMERCIAL

## 2020-10-01 DIAGNOSIS — R29.3 POOR POSTURE: ICD-10-CM

## 2020-10-01 DIAGNOSIS — M62.81 MUSCLE WEAKNESS OF LOWER EXTREMITY: ICD-10-CM

## 2020-10-01 PROCEDURE — 97110 THERAPEUTIC EXERCISES: CPT | Mod: PN,CQ

## 2020-10-01 NOTE — PROGRESS NOTES
"  Physical Therapy Daily Treatment Note     Name: Nakia Jaime  Clinic Number: [id number]    Therapy Diagnosis:   Encounter Diagnoses   Name Primary?    Muscle weakness of lower extremity     Poor posture      Physician: Allen Moreno MD    Visit Date: 10/1/2020    Physician Orders: PT Eval and Treat   Medical Diagnosis: M54.42,M54.41 (ICD-10-CM) - Acute bilateral low back pain with bilateral sciatica  Evaluation Date: 8/26/2020  Authorization Period Expiration: 12/31/2020  Plan of Care Certification Period: 10/21/2020  Visit # / Visits authorized: 9/ 20     Time In: 0800  Time Out: 0845  Total Billable Time: 30 minutes     Precautions: Standard       Subjective   States she saw improvement in her pain since her last PT session. States she felt like the DN really helped. States she was able to walk and stand with less pain.     She was compliant with home exercise program.  Response to previous treatment: did well  Functional change: decreased pain with walking/sitting.       Pain:  Current 3/10  Location: R low back      Objective   Pt entered clinic in proper pelvic alignment.     Nakia received therapeutic exercises to develop strength, ROM, flexibility, posture and core stabilization for 30 minutes including:    Push/pull 3 x 3"  Supine hamstring stretches 3 x 30"  Piriformis stretch 3 x 30" (figure 4 technique)  DKTC x 20" (increased pain in R LE)  Bridging 10 x 10"   lumbar rotation stretch 10 x 10 seconds each  Side lying clamshells 10 x 10"   Side lying hip abd 10 x 5"   prone hip abd 10 x 5"   +Anti-rotations otb 10x  +add next visit - Bird dogs      Nakia  received the following manual therapy techniques: dry nedling were applied to the: low back for 00 minutes, including:     Application of TDN: Pt educated on benefits and potential side effects of dry needling. Educated pt on benefits, precautions, side effects following TDN. Educated pt to use heat following treatment sessions if pt is " experiencing pain or soreness. Pt verbalized good understanding of education.  Pt signed written consent to dry needling. Pt gave verbal consent for DN     Pt received dry needling to the below listed muscles using 60 mm needles.  B L4, L5 paraspinals    Winding performed every 5 minutes during treatment.    Nakia received moist heat to low back x 00 minutes following dry needling today.    Education provided:    -Posture and mechanics when bending/squating    Written Home Exercises Provided: Patient instructed to cont prior HEP.  Exercises were reviewed and Nakia was able to demonstrate them prior to the end of the session.  Nakia demonstrated good  understanding of the education provided.     See EMR under Patient Instructions for exercises provided prior visit.    Assessment     Pt reported decreaed pain and improved ADL performance since her last PT visit. Decreased muscle guarding was noted during there-ex and with clinic ambulation. Pt received FDN from Robin Brown DPT.  Will continue to work on improving core strength and promote improved flexibility and decreased pain.  PT/PTA face to face conference concerning pt status/TX.       Nakia is progressing well towards her goals.   Pt prognosis is Good.       Pt will continue to benefit from skilled outpatient physical therapy to address the deficits listed in the problem list box on initial evaluation, provide pt/family education and to maximize pt's level of independence in the home and community environment.     Pt's spiritual, cultural and educational needs considered and pt agreeable to plan of care and goals.     Anticipated Barriers for therapy: none    Goals:  Short Term Goals: 4 weeks   1. Independent with HEP. (in progress)  2. Report decreased lumbar pain < or =  6/10 with adls such as sitting, standing, bending, lifting and negotiating steps while carrying something. (progressing)  3. Increased MMT for B LE by 1 muscle grade to promote proper  pelvic stability to decrease  lumbar pain < or =  6/10 with adls such as sitting, standing, bending, lifting and negotiating steps while carrying something. (progressing, not met)     Long Term Goals: 8 weeks   4. Report decreased lumbar pain < or =  3/10 with adls such as sitting, standing, bending, lifting and negotiating steps while carrying something. (progressing, not met)  5. Increased MMT for B LE to 5/5 muscle grade to promote proper pelvic stability to decrease lumbar pain < or =  3/10 with adls such as sitting, standing, bending, lifting and negotiating steps while carrying something.  (progressing, not met)  6. Increased flexibility in B hamstrings to -20 deg with 90/90 test to promote proper pelvic stability to decrease lumbar pain < or =  3/10 with adls such as sitting, standing, bending, lifting and negotiating steps while carrying something. (progressing, not met)  7. Increased flexibility in B IT bands to promote proper pelvic stability to decrease lumbar pain < or =  3/10 with adls such as sitting, standing, bending, lifting and negotiating steps while carrying something.   (progressing, not met)  8. Patient to achieve CJ at least 20% < 40% impaired, limited or restricted level on the FOTO Outcomes Measurement System. (progressing, not met)      Plan     Cont to progress towards goals set by PT focusing on core and hip strength.        Andrew Mirza, PTA

## 2020-10-01 NOTE — PROGRESS NOTES
Physical Therapy Daily Treatment Note     Name: Nakia Jaime  Clinic Number: [id number]    Therapy Diagnosis:   Encounter Diagnoses   Name Primary?    Muscle weakness of lower extremity     Poor posture      Physician: Allen Moreno MD    Visit Date: 10/1/2020    Physician Orders: PT Eval and Treat   Medical Diagnosis: M54.42,M54.41 (ICD-10-CM) - Acute bilateral low back pain with bilateral sciatica  Evaluation Date: 8/26/2020  Authorization Period Expiration: 12/31/2020  Plan of Care Certification Period: 10/21/2020  Visit # / Visits authorized: 9/ 20     Time In: 8:12 AM  Time Out: 8:27 AM  Total Billable Time: 15 minutes     Precautions: Standard       Subjective   reports she had decreased pain in her low back following dry needling last session and experienced decreased pain the following day at work as well. States the pain started to return the day after that.    She was compliant with home exercise program.  Response to previous treatment: decreased pain  Functional change: less pain with work activities for the day following last session        Pain:  Current 3/10  Location: R low back      Objective   Pt entered clinic in proper pelvic alignment.     Nakia received therapeutic exercises to develop strength, ROM, flexibility, posture and core stabilization by PTA Andrew Mirza. Please refer to his note for details.       Nakia  received the following manual therapy techniques: dry nedling were applied to the: low back for 15 minutes, including:     Application of TDN: Pt educated on benefits and potential side effects of dry needling. Educated pt on benefits, precautions, side effects following TDN. Educated pt to use heat following treatment sessions if pt is experiencing pain or soreness. Pt verbalized good understanding of education.  Pt signed written consent to dry needling. Pt gave verbal consent for DN    Pt received dry needling to the below listed muscles using 60 mm needles.  B L4, L5  paraspinals    Winding performed every 5 minutes during treatment.    Nakia received moist heat to low back x 10 minutes following dry needling today.    Education provided:    Discussed the use of heat tonight if pt experiences needle site pain.    Written Home Exercises Provided: Patient instructed to cont prior HEP.  Exercises were reviewed and Nakia was able to demonstrate them prior to the end of the session.  Nakia demonstrated good  understanding of the education provided.     See EMR under Patient Instructions for exercises provided prior visit.    Assessment     Continued with dry needling today secondary good response last session. Will continue with core strengthening and pelvic stabilization in addition to dry needling.      Nakia is progressing well towards her goals.     Pt prognosis is Good.       Pt will continue to benefit from skilled outpatient physical therapy to address the deficits listed in the problem list box on initial evaluation, provide pt/family education and to maximize pt's level of independence in the home and community environment.     Pt's spiritual, cultural and educational needs considered and pt agreeable to plan of care and goals.     Anticipated Barriers for therapy: none    Goals:  Short Term Goals: 4 weeks   1. Independent with HEP. (in progress)  2. Report decreased lumbar pain < or =  6/10 with adls such as sitting, standing, bending, lifting and negotiating steps while carrying something. (progressing)  3. Increased MMT for B LE by 1 muscle grade to promote proper pelvic stability to decrease  lumbar pain < or =  6/10 with adls such as sitting, standing, bending, lifting and negotiating steps while carrying something. (progressing, not met)     Long Term Goals: 8 weeks   4. Report decreased lumbar pain < or =  3/10 with adls such as sitting, standing, bending, lifting and negotiating steps while carrying something. (progressing, not met)  5. Increased MMT for B LE  to 5/5 muscle grade to promote proper pelvic stability to decrease lumbar pain < or =  3/10 with adls such as sitting, standing, bending, lifting and negotiating steps while carrying something.  (progressing, not met)  6. Increased flexibility in B hamstrings to -20 deg with 90/90 test to promote proper pelvic stability to decrease lumbar pain < or =  3/10 with adls such as sitting, standing, bending, lifting and negotiating steps while carrying something. (progressing, not met)  7. Increased flexibility in B IT bands to promote proper pelvic stability to decrease lumbar pain < or =  3/10 with adls such as sitting, standing, bending, lifting and negotiating steps while carrying something.   (progressing, not met)  8. Patient to achieve CJ at least 20% < 40% impaired, limited or restricted level on the FOTO Outcomes Measurement System. (progressing, not met)      Plan     Cont to progress towards goals set by PT focusing on core and hip strength.        Maxwell Brown, PT

## 2020-10-05 ENCOUNTER — CLINICAL SUPPORT (OUTPATIENT)
Dept: REHABILITATION | Facility: OTHER | Age: 35
End: 2020-10-05
Attending: ANESTHESIOLOGY
Payer: COMMERCIAL

## 2020-10-05 DIAGNOSIS — R29.3 POOR POSTURE: ICD-10-CM

## 2020-10-05 DIAGNOSIS — M62.81 MUSCLE WEAKNESS OF LOWER EXTREMITY: ICD-10-CM

## 2020-10-05 PROCEDURE — 97110 THERAPEUTIC EXERCISES: CPT | Mod: PN

## 2020-10-05 NOTE — PROGRESS NOTES
"  Physical Therapy Daily Treatment Note     Name: Nakia Jaime  Clinic Number: [id number]    Therapy Diagnosis:   Encounter Diagnoses   Name Primary?    Muscle weakness of lower extremity     Poor posture      Physician: Allen Moreno MD    Visit Date: 10/5/2020    Physician Orders: PT Eval and Treat   Medical Diagnosis: M54.42,M54.41 (ICD-10-CM) - Acute bilateral low back pain with bilateral sciatica  Evaluation Date: 8/26/2020  Authorization Period Expiration: 12/31/2020  Plan of Care Certification Period: 10/21/2020  Visit # / Visits authorized: 10/ 20     Time In: 12:15 pm  Time Out: 1:00 pm  Total Billable Time: 45 minutes     Precautions: Standard       Subjective     Nakia reports: she is feeling okay, no increased pain or complaints today or this weekend.She states a little headache after dry needling at last visit but other than that she reports a gradual decrease in pain and discomfort.      She was compliant with home exercise program.  Response to previous treatment: did well  Functional change: decreased pain with walking/sitting.       Pain:  Current 2/10  Location: R low back      Objective   Pt entered clinic in proper pelvic alignment.     Nakia received therapeutic exercises to develop strength, ROM, flexibility, posture and core stabilization for 45 minutes including:    Push/pull 3 x 3" - NP   Supine hamstring stretches 3 x 30"  Piriformis stretch 3 x 30" (figure 4 technique)  DKTC 2x 20"   Bridging 10 x 10"   lumbar rotation stretch (QL stretch)10 x 10 seconds each  Side lying clamshells 10 x 10"   Side lying hip abd 10 x 5"   prone hip extension 10 x 5"  - NP  Anti-rotations otb 10x  + Bird dogs arms only x10  + Bird dogs legs only 2x 5       Education provided:    -Posture and mechanics when bending/squating    Written Home Exercises Provided: Patient instructed to cont prior HEP.  Exercises were reviewed and Nakia was able to demonstrate them prior to the end of the session.  " "Nakia demonstrated good  understanding of the education provided.     See EMR under Patient Instructions for exercises provided prior visit.    Assessment     Patient tolerated treatment well today with addition of bird dog.  Patient reported incresed R side lumbar discomfort ("like laying on a lego or marble") with lumbar rotation stretch so modified to QL rotation stretch and patient reported appropriate stretch and no discomfort on R side.  Difficulty with bird dog exercises so arms and legs were performed separately with verbal and tactile cues more for LE than UE.  Unable to perform alternating LE and demonstrated weakness in core with increased weight shifting to supporting leg and decreased activation of abdominals.  Limited tolerance to quadruped position and WB through B UE. Continue with abdominal strengthening.      Nakia is progressing well towards her goals.   Pt prognosis is Good.       Pt will continue to benefit from skilled outpatient physical therapy to address the deficits listed in the problem list box on initial evaluation, provide pt/family education and to maximize pt's level of independence in the home and community environment.     Pt's spiritual, cultural and educational needs considered and pt agreeable to plan of care and goals.     Anticipated Barriers for therapy: none    Goals:  Short Term Goals: 4 weeks   1. Independent with HEP. (in progress)  2. Report decreased lumbar pain < or =  6/10 with adls such as sitting, standing, bending, lifting and negotiating steps while carrying something. (progressing)  3. Increased MMT for B LE by 1 muscle grade to promote proper pelvic stability to decrease  lumbar pain < or =  6/10 with adls such as sitting, standing, bending, lifting and negotiating steps while carrying something. (progressing, not met)     Long Term Goals: 8 weeks   4. Report decreased lumbar pain < or =  3/10 with adls such as sitting, standing, bending, lifting and " negotiating steps while carrying something. (progressing, not met)  5. Increased MMT for B LE to 5/5 muscle grade to promote proper pelvic stability to decrease lumbar pain < or =  3/10 with adls such as sitting, standing, bending, lifting and negotiating steps while carrying something.  (progressing, not met)  6. Increased flexibility in B hamstrings to -20 deg with 90/90 test to promote proper pelvic stability to decrease lumbar pain < or =  3/10 with adls such as sitting, standing, bending, lifting and negotiating steps while carrying something. (progressing, not met)  7. Increased flexibility in B IT bands to promote proper pelvic stability to decrease lumbar pain < or =  3/10 with adls such as sitting, standing, bending, lifting and negotiating steps while carrying something.   (progressing, not met)  8. Patient to achieve CJ at least 20% < 40% impaired, limited or restricted level on the FOTO Outcomes Measurement System. (progressing, not met)      Plan     Cont to progress towards goals set by PT focusing on core and hip strength.        Vielka Michael, PT

## 2020-10-07 ENCOUNTER — PATIENT MESSAGE (OUTPATIENT)
Dept: ADMINISTRATIVE | Facility: HOSPITAL | Age: 35
End: 2020-10-07

## 2020-10-12 NOTE — PROGRESS NOTES
"  Physical Therapy Daily Treatment Note     Name: Nakia Jaime  Clinic Number: [id number]    Therapy Diagnosis:   Encounter Diagnoses   Name Primary?    Muscle weakness of lower extremity     Poor posture      Physician: Allen Moreno MD    Visit Date: 10/13/2020    Physician Orders: PT Eval and Treat   Medical Diagnosis: M54.42,M54.41 (ICD-10-CM) - Acute bilateral low back pain with bilateral sciatica  Evaluation Date: 8/26/2020  Authorization Period Expiration: 12/31/2020  Plan of Care Certification Period: 10/21/2020  Visit # / Visits authorized: 11/ 20     Time In: 2:32 pm  Time Out: 3: 15 pm  Total Billable Time: 43 minutes     Precautions: Standard       Subjective     Nakia reports:  She missed her medication a few days in a row and she feels okay and less dependent on it  With 2/10 pain throughout the day.  Less pain with bending but still feels a slight twinge in her low back with certain twisting movements.     She was compliant with home exercise program.  Response to previous treatment: did well  Functional change: decreased pain with walking/sitting.       Pain:  Current 2/10  Location: R low back      Objective   Pt entered clinic in proper pelvic alignment.     Nakia participated in dynamic functional therapeutic activities to improve functional performance for 25  minutes, including:    Re-assessment - See [facility] for findings     Nakia received therapeutic exercises to develop strength, ROM, flexibility, posture and core stabilization for 18 minutes including:    Push/pull 3 x 3" - NP   Supine hamstring stretches 3 x 30" - NP  Piriformis stretch 3 x 30" (figure 4 technique) - NP  DKTC 2x 20"  - NP  Bridging 10 x 10"  - NP  lumbar rotation stretch (QL stretch)10 x 10 seconds each  Side lying clamshells 10 x 10"  - NP  Side lying hip abd 10 x 5"  - NP  prone hip extension 10 x 5"  - NP  Anti-rotations otb 10x - NP  Bird dogs arms only x10 each and x10 alternating  Bird dogs legs only 2x 5  + " Multifidus in quadruped 2x 10       Education provided:    -Posture and mechanics when bending/squating    Written Home Exercises Provided: Patient instructed to cont prior HEP.  Exercises were reviewed and Nakia was able to demonstrate them prior to the end of the session.  Nakia demonstrated good  understanding of the education provided.     See EMR under Patient Instructions for exercises provided prior visit.    Assessment     See UPOC    Nakia is progressing well towards her goals.   Pt prognosis is Good.       Pt will continue to benefit from skilled outpatient physical therapy to address the deficits listed in the problem list box on initial evaluation, provide pt/family education and to maximize pt's level of independence in the home and community environment.     Pt's spiritual, cultural and educational needs considered and pt agreeable to plan of care and goals.     Anticipated Barriers for therapy: none    Goals: see UPOC      Plan     See UPOC      Vielka Michael, PT

## 2020-10-13 ENCOUNTER — CLINICAL SUPPORT (OUTPATIENT)
Dept: REHABILITATION | Facility: OTHER | Age: 35
End: 2020-10-13
Attending: ANESTHESIOLOGY
Payer: COMMERCIAL

## 2020-10-13 DIAGNOSIS — M62.81 MUSCLE WEAKNESS OF LOWER EXTREMITY: ICD-10-CM

## 2020-10-13 DIAGNOSIS — R29.3 POOR POSTURE: ICD-10-CM

## 2020-10-13 PROCEDURE — 97530 THERAPEUTIC ACTIVITIES: CPT | Mod: PN

## 2020-10-13 PROCEDURE — 97110 THERAPEUTIC EXERCISES: CPT | Mod: PN

## 2020-10-13 NOTE — PLAN OF CARE
Outpatient Therapy Updated Plan of Care     Visit Date: 10/13/2020    Name: Nakia Jaime  Clinic Number: [id number]    Therapy Diagnosis:   Encounter Diagnoses   Name Primary?    Muscle weakness of lower extremity     Poor posture      Physician: Allen Moreno MD    Physician Orders: PT Eval and Treat  Medical Diagnosis from Referral: Acute bilateral low back pain with bilateral sciatica (M54.42, M54.41)  Evaluation Date: 10/13/2020  Current Certification Period:  08/26/2020 to 10/21/2020  Authorization Period Expiration: 12/31/2020  Plan of Care Expiration: 12/01/2020  Visit # / Visits authorized: 11/ 20    Precautions: Standard  Functional Level Prior to Evaluation:  I with amb and ADL    Subjective     Update: Nakia reports:  She missed her medication a few days in a row and she feels okay and less dependent on it  With 2/10 pain throughout the day.  Less pain with bending but still feels a slight twinge in her low back with certain twisting movements.      She was compliant with home exercise program.  Response to previous treatment: did well some soreness and pain in low back after new bird dog exercises.  Functional change: decreased pain with walking/sitting.        Pain:  Current 2/10  Location: R low back    Objective     Update:   Lumbar active range of motion in standing is: - IE  Flexion 65 deg, pain with return   Extension 25 deg with pain   Left side bending 25 deg   Right side bending 28 deg   Left rotation Decreased 10 deg   Right rotation WFL       10/13/2020   Pain with last few degrees going into flexion  Pain at end range extension        Flexibility testing: - IE  - hamstrings:         B: tight, R: -40 deg, L: -40 deg             - gastrocnemius:   B: tight, R: neutral, L: 7 deg   10/13/2020   Hamstrings: no change     MMT - IE  MMT R L   Hip flexion 5/5 5/5   Hip abduction 5/5 5/5   Hip extension 3+/5 4-/5   Hip ER 5/5 5/5   Hip IR 3+/5 4/5   Knee extension 5/5 5/5   Knee flexion 5/5  5/5   Ankle dorsiflexion 5/5 5/5   Ankle plantar flexion 5/5 5/5   Ankle inversion 3+/5 5/5   Ankle eversion 3+/5 5/5       10/13/2020   R ankle weakness due to prior ankle injury   Inversion: 4-/5 with pain  Eversion: 4-/5  R hip IR: 4/5  R hip extension: 4-/5  L hip extension: 4/5        CMS Impairment/Limitation/Restriction for FOTO Lumbar Survey     Therapist reviewed FOTO scores for Nakia Jaime on 10/13/2020.   FOTO documents entered into [employer] - see Media section.     Limitation Score: 28%  Category: Other     Current : CJ = at least 20% but < 40% impaired, limited or restricted  Goal: CJ = at least 20% but < 40% impaired, limited or restricted             Assessment     Update:     Short Term Goals: 4 weeks   1. Independent with HEP. (MET 10/13/2020 )  2. Report decreased lumbar pain < or =  6/10 with adls such as sitting, standing, bending, lifting and negotiating steps while carrying something. (MET 10/13/2020 )  3. Increased MMT for B LE by 1 muscle grade to promote proper pelvic stability to decrease  lumbar pain < or =  6/10 with adls such as sitting, standing, bending, lifting and negotiating steps while carrying something. (progressing, not met)     Long Term Goals: 8 weeks   4. Report decreased lumbar pain < or =  3/10 with adls such as sitting, standing, bending, lifting and negotiating steps while carrying something. (MET 10/13/2020 )  5. Increased MMT for B LE to 5/5 muscle grade to promote proper pelvic stability to decrease lumbar pain < or =  3/10 with adls such as sitting, standing, bending, lifting and negotiating steps while carrying something.  (progressing, not met)  6. Increased flexibility in B hamstrings to -20 deg with 90/90 test to promote proper pelvic stability to decrease lumbar pain < or =  3/10 with adls such as sitting, standing, bending, lifting and negotiating steps while carrying something. (progressing, not met)  7. Increased flexibility in B IT bands to promote proper  pelvic stability to decrease lumbar pain < or =  3/10 with adls such as sitting, standing, bending, lifting and negotiating steps while carrying something.   (progressing, not met)  8. Patient to achieve CJ at least 20% < 40% impaired, limited or restricted level on the FOTO Outcomes Measurement System. (MET 10/13/2020 )    Previous Short Term Goals Status:   Patient was progress with short term goals   New Short Term Goals Status:   Patient has met STG 2 reporting consistent pain of </= 3/10 pain with activities at the most. Patient continues to report compliance with HEP to meet STG 1.   Long Term Goal Status:   continue per initial plan of care.  Reasons for Recertification of Therapy:   Patient has made great progress during therapy sessions.  She has improved with ROM and functional mobility but continues to have twinges and episodes of acute pain that come and go with twisting motions as well as an apprehension to do certain movements. Patient continues to lack core strength with dual activities challenging abdominals with low back compensating for movement.  Patient will continue to benefit from additional skilled physical therapy visits to address remaining deficits in hip and core strength, flexibility and improve confidence in mobility to decrease apprehension and return patient to prior level of function.     Plan     Updated Certification Period: 10/13/2020 to 12/01/2020  Recommended Treatment Plan: 1-2 times per week for up to 8 visits: Manual Therapy, Moist Heat/ Ice, Neuromuscular Re-ed, Patient Education, Therapeutic Activites, Therapeutic Exercise, Ultrasound and dry needling    Vielka Michael, PT  10/13/2020      I CERTIFY THE NEED FOR THESE SERVICES FURNISHED UNDER THIS PLAN OF TREATMENT AND WHILE UNDER MY CARE    Physician's comments:        Physician's Signature: ___________________________________________________

## 2020-10-26 ENCOUNTER — PATIENT MESSAGE (OUTPATIENT)
Dept: PAIN MEDICINE | Facility: CLINIC | Age: 35
End: 2020-10-26

## 2020-11-03 ENCOUNTER — CLINICAL SUPPORT (OUTPATIENT)
Dept: REHABILITATION | Facility: OTHER | Age: 35
End: 2020-11-03
Attending: ANESTHESIOLOGY
Payer: COMMERCIAL

## 2020-11-03 DIAGNOSIS — R29.3 POOR POSTURE: ICD-10-CM

## 2020-11-03 DIAGNOSIS — M62.81 MUSCLE WEAKNESS OF LOWER EXTREMITY: ICD-10-CM

## 2020-11-03 PROCEDURE — 97110 THERAPEUTIC EXERCISES: CPT | Mod: PN

## 2020-11-03 NOTE — PROGRESS NOTES
"  Physical Therapy Daily Treatment Note     Name: Nakia Jaime  Clinic Number: [id number]    Therapy Diagnosis:   Encounter Diagnoses   Name Primary?    Muscle weakness of lower extremity     Poor posture      Physician: Allen Moreno MD    Visit Date: 11/3/2020    Physician Orders: PT Eval and Treat   Medical Diagnosis: M54.42,M54.41 (ICD-10-CM) - Acute bilateral low back pain with bilateral sciatica  Evaluation Date: 8/26/2020  Authorization Period Expiration: 12/31/2020  Plan of Care Certification Period: 12/01/2020  Visit # / Visits authorized: 11/ 20     Time In: 0830  Time Out: 0917  Total Billable Time: 47 minutes     Precautions: Standard       Subjective     Nakia reports: Her back has been hurting a little more lately. Has been sleeping more lately, but schedule has been quite irregular. Has been taking 1-2 gabapentin per day; has not noticed difference in pain or spasms. Has not been taking cyclobenzaprine, as she ran out.     She was compliant with home exercise program.  Response to previous treatment: did well  Functional change: decreased pain with walking/sitting.       Pain:  Current 3-4/10   Location: R low back      Objective   Pt entered clinic in proper pelvic alignment.     Nakia participated in dynamic functional therapeutic activities to improve functional performance for 47 minutes, including:       Nakia received therapeutic exercises to develop strength, ROM, flexibility, posture and core stabilization for 47 minutes including:    Push/pull 3 x 3"   Supine hamstring stretches 3 x 30" ea   Piriformis stretch 3 x 30" (figure 4 technique)   DKTC 10x 5"    Bridging 10 x 10"    lumbar rotation stretch (QL stretch) 3 x 30 seconds ea  + Quad cat/camel 2x10 x 3"   + Quad to child's pose 10x10"     Side lying clamshells 10 x 10"    Side lying hip abd 10 x 5"      + prone hip extension - knee bent, knee straight 2x10 x 5"  Ea  Multifidus lift in quadruped 2x 10   Bird dogs arms only x10 " each and x10 alternating  Bird dogs legs only 2x 5    Anti-rotations otb 2x10     Education provided:      - Importance of sleep regularity in pain management   - Use of stretching pre/post sleep for decreased stiffness    Written Home Exercises Provided: Patient instructed to cont prior HEP.  Exercises were reviewed and Nakia was able to demonstrate them prior to the end of the session.  Nakia demonstrated good  understanding of the education provided.     See EMR under Patient Instructions for exercises provided prior visit.    Assessment     Nakia performed well in today's session, tolerating progression of exercise without significant exacerbation of symptoms. Emphasis placed on strengthening of core/trunk and hip extensors. Deficits in core and trunk endurance noted with bird dogs and multifidus lifts. Slight discomfort noted with prone hip extension with knee bent on R; decreased discomfort when pillow doubled under abdomen for more neutral spinal positioning. SI levels found to be even today. Nakia would benefit from continued strengthening of spine and hip extensors.     Nakia is progressing well towards her goals.   Pt prognosis is Good.       Pt will continue to benefit from skilled outpatient physical therapy to address the deficits listed in the problem list box on initial evaluation, provide pt/family education and to maximize pt's level of independence in the home and community environment.     Pt's spiritual, cultural and educational needs considered and pt agreeable to plan of care and goals.     Anticipated Barriers for therapy: none    Goals:   Short Term Goals: 4 weeks   1. Independent with HEP. (MET 10/13/2020 )  2. Report decreased lumbar pain < or =  6/10 with adls such as sitting, standing, bending, lifting and negotiating steps while carrying something. (MET 10/13/2020 )  3. Increased MMT for B LE by 1 muscle grade to promote proper pelvic stability to decrease  lumbar pain < or  =  6/10 with adls such as sitting, standing, bending, lifting and negotiating steps while carrying something. (progressing, not met)     Long Term Goals: 8 weeks   4. Report decreased lumbar pain < or =  3/10 with adls such as sitting, standing, bending, lifting and negotiating steps while carrying something. (MET 10/13/2020 )  5. Increased MMT for B LE to 5/5 muscle grade to promote proper pelvic stability to decrease lumbar pain < or =  3/10 with adls such as sitting, standing, bending, lifting and negotiating steps while carrying something.  (progressing, not met)  6. Increased flexibility in B hamstrings to -20 deg with 90/90 test to promote proper pelvic stability to decrease lumbar pain < or =  3/10 with adls such as sitting, standing, bending, lifting and negotiating steps while carrying something. (progressing, not met)  7. Increased flexibility in B IT bands to promote proper pelvic stability to decrease lumbar pain < or =  3/10 with adls such as sitting, standing, bending, lifting and negotiating steps while carrying something.   (progressing, not met)  8. Patient to achieve CJ at least 20% < 40% impaired, limited or restricted level on the FOTO Outcomes Measurement System. (MET 10/13/2020 )    Plan     Cont with POC, progressing with flexibility and strengthening of LEs and trunk. PT to progress with functional training as tolerated.       IZZY COCHRAN, PT, DPT  11/3/2020

## 2020-11-09 ENCOUNTER — HOSPITAL ENCOUNTER (OUTPATIENT)
Facility: OTHER | Age: 35
Discharge: HOME | End: 2020-11-09
Attending: ANESTHESIOLOGY | Admitting: ANESTHESIOLOGY
Payer: COMMERCIAL

## 2020-11-09 VITALS
OXYGEN SATURATION: 100 % | WEIGHT: 143 LBS | HEART RATE: 83 BPM | RESPIRATION RATE: 18 BRPM | HEIGHT: 62 IN | TEMPERATURE: 98 F | DIASTOLIC BLOOD PRESSURE: 65 MMHG | BODY MASS INDEX: 26.31 KG/M2 | SYSTOLIC BLOOD PRESSURE: 95 MMHG

## 2020-11-09 DIAGNOSIS — M54.17 LUMBOSACRAL RADICULOPATHY: Primary | ICD-10-CM

## 2020-11-09 DIAGNOSIS — G89.29 CHRONIC PAIN: ICD-10-CM

## 2020-11-09 PROCEDURE — 64484 PRA INJECT ANES/STEROID FORAMEN LUMBAR/SACRAL W IMG GUIDE ,EA ADD LEVEL: ICD-10-PCS | Mod: RT,,, | Performed by: ANESTHESIOLOGY

## 2020-11-09 PROCEDURE — 64483 NJX AA&/STRD TFRM EPI L/S 1: CPT | Mod: RT,,, | Performed by: ANESTHESIOLOGY

## 2020-11-09 PROCEDURE — 64483 PR EPIDURAL INJ, ANES/STEROID, TRANSFORAMINAL, LUMB/SACR, SNGL LEVL: ICD-10-PCS | Mod: RT,,, | Performed by: ANESTHESIOLOGY

## 2020-11-09 PROCEDURE — 25500020 PHARM REV CODE 255: Performed by: ANESTHESIOLOGY

## 2020-11-09 PROCEDURE — 64484 NJX AA&/STRD TFRM EPI L/S EA: CPT | Mod: RT,,, | Performed by: ANESTHESIOLOGY

## 2020-11-09 PROCEDURE — 25000003 PHARM REV CODE 250: Performed by: ANESTHESIOLOGY

## 2020-11-09 PROCEDURE — 64483 NJX AA&/STRD TFRM EPI L/S 1: CPT | Mod: RT | Performed by: ANESTHESIOLOGY

## 2020-11-09 PROCEDURE — 64484 NJX AA&/STRD TFRM EPI L/S EA: CPT | Mod: RT | Performed by: ANESTHESIOLOGY

## 2020-11-09 PROCEDURE — 63600175 PHARM REV CODE 636 W HCPCS: Performed by: ANESTHESIOLOGY

## 2020-11-09 RX ORDER — LIDOCAINE HYDROCHLORIDE 10 MG/ML
INJECTION, SOLUTION EPIDURAL; INFILTRATION; INTRACAUDAL; PERINEURAL
Status: DISCONTINUED | OUTPATIENT
Start: 2020-11-09 | End: 2020-11-09 | Stop reason: HOSPADM

## 2020-11-09 RX ORDER — SODIUM CHLORIDE 9 MG/ML
500 INJECTION, SOLUTION INTRAVENOUS CONTINUOUS
Status: DISCONTINUED | OUTPATIENT
Start: 2020-11-09 | End: 2020-11-09 | Stop reason: HOSPADM

## 2020-11-09 RX ORDER — DEXAMETHASONE SODIUM PHOSPHATE 100 MG/10ML
INJECTION INTRAMUSCULAR; INTRAVENOUS
Status: DISCONTINUED | OUTPATIENT
Start: 2020-11-09 | End: 2020-11-09 | Stop reason: HOSPADM

## 2020-11-09 RX ORDER — FENTANYL CITRATE 50 UG/ML
INJECTION, SOLUTION INTRAMUSCULAR; INTRAVENOUS
Status: DISCONTINUED | OUTPATIENT
Start: 2020-11-09 | End: 2020-11-09 | Stop reason: HOSPADM

## 2020-11-09 RX ORDER — MIDAZOLAM HYDROCHLORIDE 1 MG/ML
INJECTION INTRAMUSCULAR; INTRAVENOUS
Status: DISCONTINUED | OUTPATIENT
Start: 2020-11-09 | End: 2020-11-09 | Stop reason: HOSPADM

## 2020-11-09 RX ORDER — LIDOCAINE HYDROCHLORIDE 10 MG/ML
INJECTION INFILTRATION; PERINEURAL
Status: DISCONTINUED | OUTPATIENT
Start: 2020-11-09 | End: 2020-11-09 | Stop reason: HOSPADM

## 2020-11-09 RX ADMIN — SODIUM CHLORIDE 500 ML: 0.9 INJECTION, SOLUTION INTRAVENOUS at 01:11

## 2020-11-09 NOTE — H&P
"HPI  Patient presenting for Procedure(s) (LRB):  INJECTION, STEROID, EPIDURAL, TRANSFORAMINAL APPROACH, l5-s1 ans s1 (Right)     Patient on Anti-coagulation No    No health changes since previous encounter    Past Medical History:   Diagnosis Date    COPD (chronic obstructive pulmonary disease)      Past Surgical History:   Procedure Laterality Date    crown tooth      LUNG BIOPSY      drained lungs of fluid     Review of patient's allergies indicates:  No Known Allergies   Current Facility-Administered Medications   Medication    0.9%  NaCl infusion       PMHx, PSHx, Allergies, Medications reviewed in epic    ROS negative except pain complaints in HPI    OBJECTIVE:    /75 (BP Location: Right arm, Patient Position: Sitting)   Pulse 68   Temp 98 °F (36.7 °C) (Oral)   Resp 18   Ht 5' 2" (1.575 m)   Wt 64.9 kg (143 lb)   SpO2 100%   BMI 26.16 kg/m²     PHYSICAL EXAMINATION:    GENERAL: Well appearing, in no acute distress, alert and oriented x3.  PSYCH:  Mood and affect appropriate.  SKIN: Skin color, texture, turgor normal, no rashes or lesions which will impact the procedure.  CV: RRR with palpation of the radial artery.  PULM: No evidence of respiratory difficulty, symmetric chest rise. Clear to auscultation.  NEURO: Cranial nerves grossly intact.    Plan:    Proceed with procedure as planned Procedure(s) (LRB):  INJECTION, STEROID, EPIDURAL, TRANSFORAMINAL APPROACH, l5-s1 ans s1 (Right)    Curtis Liang  11/09/2020            "

## 2020-11-09 NOTE — OP NOTE
Date of Service: 11/09/2020    PCP: Maxwell Fischer MD    Referring Physician:    Time-out taken to identify patient and procedure side prior to starting the procedure.   I attest that I have reviewed the patient's home medications prior to the procedure and no contraindication have been identified. I  re-evaluated the patient after the patient was positioned for the procedure in the procedure room immediately before the procedural time-out. The vital signs are current and represent the current state of the patient which has not significantly changed since the preprocedure assessment.                                                           PROCEDURE: Right L5/S1 & S1 transforaminal epidural steroid injection under fluoroscopy    REASON FOR PROCEDURE: Right DDD (degenerative disc disease), lumbar [M51.36]  1. Lumbosacral radiculopathy    2. Chronic pain      POSTPROCEDURE DIAGNOSIS:   DDD (degenerative disc disease), lumbar [M51.36]    1. Lumbosacral radiculopathy    2. Chronic pain           PHYSICIAN: Cristofer Linda MD  ASSISTANTS:Curtis Liang MD [facility] pain fellow       MEDICATIONS INJECTED:  Preservative-free dexamethasone 10mg, Xylocaine 1% MPF 3-5ml. 3ml per level. Preservative free, sterile normal saline is used to get larger volume as needed.  LOCAL ANESTHETIC INJECTED:  Xylocaine 1% 9ml with Sodium Bicarbonate 1ml. 3ml per site.    SEDATION MEDICATIONS: local/IV sedation: Versed 2 mg and fentanyl 25 mcg IV.  Conscious sedation ordered by MD.  Patient reevaluated and sedation administered by MD and monitored by RN.  Total sedation time was less than 5 min. (See nurse documentation and case log for sedation time)    ESTIMATED BLOOD LOSS:  None.    COMPLICATIONS:  None.    TECHNIQUE:   Laying in a prone position, the patient was prepped and draped in the usual sterile fashion using ChloraPrep and fenestrated drape.  The area to be injected was determined under fluoroscopic guidance.  Local  anesthetic was given by raising a wheel and going down to the hub of a 27-gauge 1.25 inch needle.  The 3.5inch 22-gauge spinal needle was introduced towards the transverse process of all levels as stated above.   The needle was walked medially then hinged into the neural foramen.  Omnipaque was injected to confirm appropriate placement and that there was no vascular runoff.  The medication was then injected after applying negative pressure. The patient tolerated the procedure well.    PAIN BEFORE THE PROCEDURE: 2-7/10.    PAIN AFTER THE PROCEDURE: 0/10.    The patient was monitored after the procedure.  Patient was given post procedure and discharge instructions to follow at home.  We will see the patient back in two weeks or the patient may call to inform of status. The patient was discharged in a stable condition.

## 2020-11-09 NOTE — BRIEF OP NOTE
Discharge Note  Short Stay      SUMMARY     Admit Date: 11/9/2020    Attending Physician: Cristofer Linda      Discharge Physician: Cristofer Linda      Discharge Date: 11/9/2020 1:57 PM    Procedure(s) (LRB):  INJECTION, STEROID, EPIDURAL, TRANSFORAMINAL APPROACH, l5-s1 ans s1 (Right)    Final Diagnosis: DDD (degenerative disc disease), lumbar [M51.36]    Disposition: Home or self care    Patient Instructions:   Current Discharge Medication List      CONTINUE these medications which have NOT CHANGED    Details   gabapentin (NEURONTIN) 300 MG capsule Take 1 capsule (300 mg total) by mouth 3 (three) times daily. One at night for 7 days, then 2 a day for 7 days, then 3 a day  Qty: 90 capsule, Refills: 2      valACYclovir (VALTREX) 500 MG tablet Take 1 tablet (500 mg total) by mouth 2 (two) times daily.  Qty: 30 tablet, Refills: 0                 Discharge Diagnosis: DDD (degenerative disc disease), lumbar [M51.36]  Condition on Discharge: Stable with no complications to procedure   Diet on Discharge: Same as before.  Activity: as per instruction sheet.  Discharge to: Home with a responsible adult.  Follow up: 2-4 weeks       Please call my office or pager at [phone] if experienced any weakness or loss of sensation, fever > 101.5, pain uncontrolled with oral medications, persistent nausea/vomiting/or diarrhea, redness or drainage from the incisions, or any other worrisome concerns. If physician on call was not reached or could not communicate with our office for any reason please go to the nearest emergency department

## 2020-11-09 NOTE — DISCHARGE INSTRUCTIONS

## 2020-11-16 ENCOUNTER — CLINICAL SUPPORT (OUTPATIENT)
Dept: REHABILITATION | Facility: OTHER | Age: 35
End: 2020-11-16
Attending: ANESTHESIOLOGY
Payer: COMMERCIAL

## 2020-11-16 DIAGNOSIS — M62.81 MUSCLE WEAKNESS OF LOWER EXTREMITY: ICD-10-CM

## 2020-11-16 DIAGNOSIS — R29.3 POOR POSTURE: ICD-10-CM

## 2020-11-16 PROCEDURE — 97110 THERAPEUTIC EXERCISES: CPT | Mod: PN

## 2020-11-16 NOTE — PROGRESS NOTES
"  Physical Therapy Daily Treatment Note     Name: Nakia Jaime  Clinic Number: [id number]    Therapy Diagnosis:   Encounter Diagnoses   Name Primary?    Muscle weakness of lower extremity     Poor posture      Physician: Allen Moreno MD    Visit Date: 11/16/2020    Physician Orders: PT Eval and Treat   Medical Diagnosis: M54.42,M54.41 (ICD-10-CM) - Acute bilateral low back pain with bilateral sciatica  Evaluation Date: 8/26/2020  Authorization Period Expiration: 12/31/2020  Plan of Care Certification Period: 12/01/2020  Visit # / Visits authorized: 12/ 20     Time In: 11:20 AM  Time Out: 11:55 AM  Total Billable Time: 35 minutes     Precautions: Standard       Subjective     Nakia reports: she is feeling "pretty good" this morning. States she has had injections since her last PT session    She was compliant with home exercise program.  Response to previous treatment: no adverse effects  Functional change: bending forward does not hurt as bad. Can walk farther distances.       Pain:  Current 2/10   Location: R low back      Objective       Nakia received therapeutic exercises to develop strength, ROM, flexibility, posture and core stabilization for 35 minutes including:    Push/pull 3 x 3"   Supine hamstring stretches 3 x 30" ea   Piriformis stretch 3 x 30" (figure 4 technique)   DKTC 10x 5"    Bridging 10 x 10"    lumbar rotation stretch (QL stretch) 3 x 30 seconds ea  Quad cat/camel 20 x 3"   Quad to child's pose 10 x 10"     Side lying clamshells 10 x 10"    Side lying hip abd 10 x 5"      prone hip extension - knee bent, knee straight 2 x 10 x 5"  each  Multifidus lift in quadruped 2 x 10   Bird dogs 10 x 5"  Bird dogs legs only 2x 5    Anti-rotations otb 2x10     Education provided:          Written Home Exercises Provided: Patient instructed to cont prior HEP.  Exercises were reviewed and Nakia was able to demonstrate them prior to the end of the session.  Nakia demonstrated good  understanding " of the education provided.     See EMR under Patient Instructions for exercises provided prior visit.    Assessment     Will continue with glut activation and strengthening. Discussed correct lift/bending posture at work.    Nakia is progressing well towards her goals.   Pt prognosis is Good.       Pt will continue to benefit from skilled outpatient physical therapy to address the deficits listed in the problem list box on initial evaluation, provide pt/family education and to maximize pt's level of independence in the home and community environment.     Pt's spiritual, cultural and educational needs considered and pt agreeable to plan of care and goals.     Anticipated Barriers for therapy: none    Goals:   Short Term Goals: 4 weeks   1. Independent with HEP. (MET 10/13/2020 )  2. Report decreased lumbar pain < or =  6/10 with adls such as sitting, standing, bending, lifting and negotiating steps while carrying something. (MET 10/13/2020 )  3. Increased MMT for B LE by 1 muscle grade to promote proper pelvic stability to decrease  lumbar pain < or =  6/10 with adls such as sitting, standing, bending, lifting and negotiating steps while carrying something. (progressing, not met)     Long Term Goals: 8 weeks   4. Report decreased lumbar pain < or =  3/10 with adls such as sitting, standing, bending, lifting and negotiating steps while carrying something. (MET 10/13/2020 )  5. Increased MMT for B LE to 5/5 muscle grade to promote proper pelvic stability to decrease lumbar pain < or =  3/10 with adls such as sitting, standing, bending, lifting and negotiating steps while carrying something.  (progressing, not met)  6. Increased flexibility in B hamstrings to -20 deg with 90/90 test to promote proper pelvic stability to decrease lumbar pain < or =  3/10 with adls such as sitting, standing, bending, lifting and negotiating steps while carrying something. (progressing, not met)  7. Increased flexibility in B IT bands to  promote proper pelvic stability to decrease lumbar pain < or =  3/10 with adls such as sitting, standing, bending, lifting and negotiating steps while carrying something.   (progressing, not met)  8. Patient to achieve CJ at least 20% < 40% impaired, limited or restricted level on the FOTO Outcomes Measurement System. (MET 10/13/2020 )    Plan     Cont with POC, progressing with flexibility and strengthening of LEs and trunk. PT to progress with functional training as tolerated.       Maxwell Brown, PT, DPT  11/16/2020

## 2020-11-17 ENCOUNTER — PATIENT OUTREACH (OUTPATIENT)
Dept: ADMINISTRATIVE | Facility: OTHER | Age: 35
End: 2020-11-17

## 2020-11-18 ENCOUNTER — PATIENT MESSAGE (OUTPATIENT)
Dept: PAIN MEDICINE | Facility: CLINIC | Age: 35
End: 2020-11-18

## 2020-11-18 NOTE — PROGRESS NOTES
Health Maintenance Due   Topic Date Due    Hepatitis C Screening  1985    HIV Screening  04/24/2002    TETANUS VACCINE  04/24/2005    Cervical Cancer Screening  04/24/2008    Influenza Vaccine (1) 08/01/2020     Updates were requested from care everywhere.  Chart was reviewed for overdue Proactive [facility] Encounters (BRIJESH) topics (CRS, Breast Cancer Screening, Eye exam)  Health Maintenance has been updated.  LINKS immunization registry triggered.  Immunizations were reconciled.  LINKS not responding

## 2020-11-19 ENCOUNTER — OFFICE VISIT (OUTPATIENT)
Dept: PAIN MEDICINE | Facility: CLINIC | Age: 35
End: 2020-11-19
Payer: COMMERCIAL

## 2020-11-19 DIAGNOSIS — G89.4 CHRONIC PAIN SYNDROME: ICD-10-CM

## 2020-11-19 DIAGNOSIS — M54.17 LUMBOSACRAL RADICULOPATHY: Primary | ICD-10-CM

## 2020-11-19 PROCEDURE — 99213 PR OFFICE/OUTPT VISIT, EST, LEVL III, 20-29 MIN: ICD-10-PCS | Mod: 95,,, | Performed by: NURSE PRACTITIONER

## 2020-11-19 PROCEDURE — 99213 OFFICE O/P EST LOW 20 MIN: CPT | Mod: 95,,, | Performed by: NURSE PRACTITIONER

## 2020-11-19 NOTE — PROGRESS NOTES
Chronic Pain - Established follow up.     Referring Physician: Maxwell Fischer*    Chief Complaint: Low back pain     SUBJECTIVE:    Interval History 11/19/2020:  The patient presents for follow-up of lower back pain.  She is status post transforaminal epidural injection.  She states 90% improvement.  She is not focally taking any Celebrex of gabapentin at this time.  She is very pleased with results, denies any new areas of pain, denies any neurological changes, denies any loss of bowel, bladder or saddle paresthesias.    Interval History 9/21/2020:  The patient presents for follow up of lower back pain. Pt denies radicular pains into legs. She is undergoing PT, taking celebrex 200mg, Gabapentin 300mg and flexeril with benefit without adverse side effects. Pt states no pain with valsalva and denies a.m. stiffness. The patient denies myelopathic symptoms such as handwriting changes or difficulty with buttons/coins/keys. Denies perineal paresthesias, bowel/bladder dysfunction.      HPI:  Nakia Jaime presents to the clinic for the evaluation of low back pain. The pain started 2 weeks ago and symptoms have been worsening.The pain is located in the low back area and radiates to the bilateral groins.  The pain is described as aching, sharp and shooting and is rated as 7/10. The pain is rated with a score of  5/10 on the BEST day and a score of 8/10 on the WORST day.  Symptoms interfere with work. The pain is exacerbated by Sitting, Bending and Coughing/Sneezing.  The pain is mitigated by medications. The patient reports 7 hours of uninterrupted sleep per night. She has tried stretching and advil with some relief.    Patient denies night fever/night sweats, urinary incontinence, bowel incontinence, significant weight loss, significant motor weakness and loss of sensations.    Physical Therapy/Home Exercise: yes for her ankle    Pain Disability Index Review:  Last 3 PDI Scores 8/26/2020   Pain Disability Index  (PDI) 21       Pain Medications:  Advil     report:  Reviewed and consistent with medication use as prescribed.    Pain Procedures:   11/9/2020: Left L5/s1&S1 TFESI    Imaging:   MRI LUMBAR SPINE WITHOUT CONTRAST     CLINICAL HISTORY:  Back pain or radiculopathy, > 6 wks; Spondylolisthesis, lumbosacral region     TECHNIQUE:  Sagittal T1, T2, stir and axial T1-T2 imaging of the lumbar spine without contrast.     COMPARISON:  Lumbar radiograph 08/26/2020     FINDINGS:  There is trace grade 1 anterolisthesis of L5 on S1.  The remainder of the lumbar sagittal alignment is within normal limits.  The lumbar vertebral body heights and contours are within normal is without evidence for acute fracture or subluxation.     The distal spinal cord and conus is normal in signal and contour tip of the conus approximates the inferior L1 level.     No aneurysmal dilatation of the visualized abdominal aorta.     T12/L1 through L1/L2: No significant disc bulge, central canal or neural foraminal stenosis.     L2/L3: Trace disc bulge without significant central canal or neural foraminal stenosis.     L3/L4:Posterior disc osteophyte with ligamentum flavum hypertrophy without significant central canal stenosis and mild bilateral neural foraminal stenosis.     L4/L5:Posterior disc osteophyte with ligamentum flavum hypertrophy and facet joint arthropathy without significant central canal stenosis and mild bilateral neural foraminal stenosis.     L5/S1: Posterior disc osteophyte with superimposed right paracentral disc protrusion superimposed facet joint arthropathy with mild central canal narrowing and moderate right and severe left neural foraminal stenosis.     Impression:     Spondylo degenerative change lumbar spine most pronounced at L5/S1 with posterior disc osteophyte and right paracentral disc protrusion with facet joint arthropathy overall accentuated by anterolisthesis with mild central canal stenosis and moderate right with  severe left neural foraminal stenosis.     Please see above for additional details      Past Medical History:   Diagnosis Date    COPD (chronic obstructive pulmonary disease)      Past Surgical History:   Procedure Laterality Date    crown tooth      LUNG BIOPSY      drained lungs of fluid     Social History     Socioeconomic History    Marital status: Single     Spouse name: Not on file    Number of children: Not on file    Years of education: Not on file    Highest education level: Not on file   Occupational History    Not on file   Social Needs    Financial resource strain: Not hard at all    Food insecurity     Worry: Never true     Inability: Never true    Transportation needs     Medical: Patient refused     Non-medical: No   Tobacco Use    Smoking status: Never Smoker    Smokeless tobacco: Never Used   Substance and Sexual Activity    Alcohol use: Yes     Frequency: 2-4 times a month     Drinks per session: 1 or 2     Binge frequency: Less than monthly     Comment: socially    Drug use: No    Sexual activity: Yes   Lifestyle    Physical activity     Days per week: 5 days     Minutes per session: 60 min    Stress: To some extent   Relationships    Social connections     Talks on phone: Twice a week     Gets together: Never     Attends [employer] service: Not on file     Active member of club or organization: No     Attends meetings of clubs or organizations: Never     Relationship status: Living with partner   Other Topics Concern    Not on file   Social History Narrative    Not on file     Family History   Problem Relation Age of Onset    Hypertension Father     Diabetes Father     No Known Problems Mother        Review of patient's allergies indicates:  No Known Allergies    Current Outpatient Medications   Medication Sig    celecoxib (CELEBREX) 200 MG capsule Take 1 capsule (200 mg total) by mouth once daily.    HYDROcodone-acetaminophen (NORCO) 7.5-325 mg per tablet TK 1 T PO  Q 8  "H PRN P    ibuprofen (ADVIL,MOTRIN) 200 MG tablet Take 200 mg by mouth.    celecoxib (CELEBREX) 200 MG capsule Take 1 capsule (200 mg total) by mouth 2 (two) times daily as needed for Pain.    cyclobenzaprine (FLEXERIL) 5 MG tablet Take 1 tablet (5 mg total) by mouth 3 (three) times daily as needed for Muscle spasms.    gabapentin (NEURONTIN) 300 MG capsule Take 1 capsule (300 mg total) by mouth 3 (three) times daily. One at night for 7 days, then 2 a day for 7 days, then 3 a day    valACYclovir (VALTREX) 500 MG tablet Take 1 tablet (500 mg total) by mouth 2 (two) times daily. (Patient not taking: Reported on 8/26/2020)     No current facility-administered medications for this visit.        REVIEW OF SYSTEMS:    GENERAL:  No weight loss, malaise or fevers.  HEENT:  Negative for frequent or significant headaches.  NECK:  Negative for lumps, goiter, pain and significant neck swelling.  RESPIRATORY:  Negative for cough, wheezing or shortness of breath.  CARDIOVASCULAR:  Negative for chest pain, leg swelling or palpitations.  GI:  Negative for abdominal discomfort, blood in stools or black stools or change in bowel habits.  MUSCULOSKELETAL:  + Lower back pain  SKIN:  Negative for lesions, rash, and itching.  PSYCH:  Negative for  mood disorder and recent psychosocial stressors. sleep disturbance  HEMATOLOGY/LYMPHOLOGY:  Negative for prolonged bleeding, bruising easily or swollen nodes.  NEURO:   No history of headaches, syncope, paralysis, seizures or tremors.  All other reviewed and negative other than HPI.    OBJECTIVE:    /63 (BP Location: Left arm, Patient Position: Sitting)   Pulse (!) 54   Temp 98 °F (36.7 °C)   Ht 5' 2" (1.575 m)   Wt 65.3 kg (143 lb 15.4 oz)   BMI 26.33 kg/m²     PHYSICAL EXAMINATION:    General appearance: Well appearing, in no acute distress, alert and oriented x3.  Psych:  Mood and affect appropriate.  Skin: Skin color, texture, turgor normal, no rashes or lesions, in both " upper and lower body.  Head/face:  Normocephalic, atraumatic. No palpable lymph nodes.  Neck: No pain to palpation over the cervical paraspinous muscles. Spurling Negative. No pain with neck flexion, extension, or lateral flexion.   Cor: Normal Rate  Pulm: normal effort  Back: Straight leg raising in the sitting and supine positions is negative to radicular pain. Pain to palpation over the lumbar spine paraspinal area. Positive facet loading on the right which is mild,  Normal range of motion without pain reproduction.  Extremities: Peripheral joint ROM is full and pain free without obvious instability or laxity in all four extremities. No deformities, edema, or skin discoloration. Good capillary refill.  Musculoskeletal: Shoulder, hip, and knee provocative maneuvers are negative. Positive MARIANN on the left.  Bilateral upper and lower extremity strength is normal and symmetric Except for Decreased EHL on right.  No atrophy or tone abnormalities are noted.   Neuro: Bilateral lower extremity coordination and muscle stretch reflexes are physiologic and symmetric.  Plantar response are downgoing. No loss of sensation is noted.  Gait: normal.    ASSESSMENT: 35 y.o. year old female with low back pain, consistent with     1. Lumbar spondylosis  Ambulatory referral/consult to Physical/Occupational Therapy   2. Acute bilateral low back pain with bilateral sciatica  Ambulatory referral/consult to Pain Clinic    Ambulatory referral/consult to Physical/Occupational Therapy   3. Dorsalgia, unspecified  X-Ray Lumbar Complete With Flex And Ext         PLAN:     - Prior records reviewed .  - She is s/p TFESI to left L5/S1 and S1 TFESI and 90% improved  - RTC PRN    The above plan and management options were discussed at length with patient. Patient is in agreement with the above and verbalized understanding. It will be communicated with the referring physician via electronic record, fax, or mail.    Best Rajan  FIGUEROAP  9/21/2020

## 2020-11-23 ENCOUNTER — CLINICAL SUPPORT (OUTPATIENT)
Dept: REHABILITATION | Facility: OTHER | Age: 35
End: 2020-11-23
Attending: ANESTHESIOLOGY
Payer: COMMERCIAL

## 2020-11-23 DIAGNOSIS — R29.3 POOR POSTURE: ICD-10-CM

## 2020-11-23 DIAGNOSIS — M62.81 MUSCLE WEAKNESS OF LOWER EXTREMITY: ICD-10-CM

## 2020-11-23 PROCEDURE — 97110 THERAPEUTIC EXERCISES: CPT | Mod: PN

## 2020-11-23 NOTE — PROGRESS NOTES
"  Physical Therapy Daily Treatment Note     Name: Nakia Jaime  Clinic Number: [id number]    Therapy Diagnosis:   Encounter Diagnoses   Name Primary?    Muscle weakness of lower extremity     Poor posture      Physician: Allen Moreno MD    Visit Date: 11/23/2020    Physician Orders: PT Eval and Treat   Medical Diagnosis: M54.42,M54.41 (ICD-10-CM) - Acute bilateral low back pain with bilateral sciatica  Evaluation Date: 8/26/2020  Authorization Period Expiration: 12/31/2020  Plan of Care Certification Period: 12/01/2020  Visit # / Visits authorized: 13/ 20     Time In: 11:22 AM  Time Out: 12:01 PM  Total Billable Time: 39 minutes     Precautions: Standard       Subjective     Nakia reports: her back is feeling "alright." states if she moves a certain way she gets a brief intense pain. If she moves too fast when she first wakes up she feels a grabbing pain    She was not compliant with home exercise program.  Response to previous treatment: a little soreness following her session  Functional change: can do a light jog in short burst       Pain:  Current 2/10   Location: R low back      Objective     Arrived in good pelvic alignment    Nakia received therapeutic exercises to develop strength, ROM, flexibility, posture and core stabilization for 39 minutes including:    LTR with physioball x 3'  Supine hamstring stretches 3 x 30" ea   Piriformis stretch 3 x 30" (figure 4 technique)   DKTC 10 x 5"    Bridging 20 x 5" with green band   +abd walk with green band 2 x 40 ft  + Y hip ext with green band 2 x 10 reps  lumbar rotation stretch (QL stretch) 3 x 30 seconds ea  Quad cat/camel 20 x 3"   Quad to child's pose 10 x 10"     Side lying clamshells 10 x 10"    Side lying hip abd 10 x 5"      prone hip extension - knee bent, knee straight 2 x 10 x 5"  each  Multifidus lift in quadruped 2 x 10   Bird dogs 10 x 5"  Bird dogs legs only 2 x 5    Anti-rotations with otb 20 reps    Education provided:      No new HEP " given today    Written Home Exercises Provided: Patient instructed to cont prior HEP.  Exercises were reviewed and Nakia was able to demonstrate them prior to the end of the session.  Nakia demonstrated good  understanding of the education provided.     See EMR under Patient Instructions for exercises provided prior visit.    Assessment     Progressing with gluteal strengthening, core strengthening today. Added planks with good training effect.    Nakia is progressing well towards her goals.   Pt prognosis is Good.       Pt will continue to benefit from skilled outpatient physical therapy to address the deficits listed in the problem list box on initial evaluation, provide pt/family education and to maximize pt's level of independence in the home and community environment.     Pt's spiritual, cultural and educational needs considered and pt agreeable to plan of care and goals.     Anticipated Barriers for therapy: none    Goals:   Short Term Goals: 4 weeks   1. Independent with HEP. (MET 10/13/2020 )  2. Report decreased lumbar pain < or =  6/10 with adls such as sitting, standing, bending, lifting and negotiating steps while carrying something. (MET 10/13/2020 )  3. Increased MMT for B LE by 1 muscle grade to promote proper pelvic stability to decrease  lumbar pain < or =  6/10 with adls such as sitting, standing, bending, lifting and negotiating steps while carrying something. (progressing, not met)     Long Term Goals: 8 weeks   4. Report decreased lumbar pain < or =  3/10 with adls such as sitting, standing, bending, lifting and negotiating steps while carrying something. (MET 10/13/2020 )  5. Increased MMT for B LE to 5/5 muscle grade to promote proper pelvic stability to decrease lumbar pain < or =  3/10 with adls such as sitting, standing, bending, lifting and negotiating steps while carrying something.  (progressing, not met)  6. Increased flexibility in B hamstrings to -20 deg with 90/90 test to  promote proper pelvic stability to decrease lumbar pain < or =  3/10 with adls such as sitting, standing, bending, lifting and negotiating steps while carrying something. (progressing, not met)  7. Increased flexibility in B IT bands to promote proper pelvic stability to decrease lumbar pain < or =  3/10 with adls such as sitting, standing, bending, lifting and negotiating steps while carrying something.   (progressing, not met)  8. Patient to achieve CJ at least 20% < 40% impaired, limited or restricted level on the FOTO Outcomes Measurement System. (MET 10/13/2020 )    Plan     Cont with POC, progressing with flexibility and strengthening of LEs and trunk. PT to progress with functional training as tolerated.       Maxwell Brown, PT    11/23/2020

## 2020-11-29 ENCOUNTER — PATIENT MESSAGE (OUTPATIENT)
Dept: INTERNAL MEDICINE | Facility: CLINIC | Age: 35
End: 2020-11-29

## 2020-11-30 ENCOUNTER — CLINICAL SUPPORT (OUTPATIENT)
Dept: REHABILITATION | Facility: OTHER | Age: 35
End: 2020-11-30
Attending: ANESTHESIOLOGY
Payer: COMMERCIAL

## 2020-11-30 DIAGNOSIS — M62.81 MUSCLE WEAKNESS OF LOWER EXTREMITY: ICD-10-CM

## 2020-11-30 DIAGNOSIS — R29.3 POOR POSTURE: ICD-10-CM

## 2020-11-30 PROCEDURE — 97110 THERAPEUTIC EXERCISES: CPT | Mod: PN

## 2020-11-30 RX ORDER — VALACYCLOVIR HYDROCHLORIDE 500 MG/1
500 TABLET, FILM COATED ORAL 2 TIMES DAILY
Qty: 30 TABLET | Refills: 0 | Status: SHIPPED | OUTPATIENT
Start: 2020-11-30 | End: 2021-05-12 | Stop reason: SDUPTHER

## 2020-11-30 NOTE — PROGRESS NOTES
"  Outpatient Therapy Discharge Summary     Name: Nakia Jaime  Clinic Number: [id number]    Therapy Diagnosis:   Encounter Diagnoses   Name Primary?    Muscle weakness of lower extremity     Poor posture      Physician: Allen Moreno MD    Physician Orders: PT Eval and Treat   Medical Diagnosis: M54.42,M54.41 (ICD-10-CM) - Acute bilateral low back pain with bilateral sciatica  Evaluation Date: 8/26/2020      Date of Last visit: 11/30/2020  Total Visits Received: 15  Cancelled Visits: 1  No Show Visits: 0    Time In: 11:19 AM  Time Out: 11:43 AM  Total Billable Time: 24 minutes     Precautions: Standard    Subjective     Nakia reports: her back feels the same. States if she turns a certain way she feels it on the left side. States her worst pain"comes and goes" and "only hits for a minute and then goes away." states her back pain is not limiting and ADLs at home or at work    She was not compliant with home exercise program.  Response to previous treatment: did fine  Functional change: no new changes reported        Pain:  Current 2/10, at worst 4/10  Location: R low back      Objective     Arrived in good pelvic alignment    Lumbar active range of motion in standing is: - IE  Flexion 70 deg, pain with return   Extension 40 deg pain with initial movement   Left side bending 25 deg   Right side bending 30 deg   Left rotation Decreased 10 deg   Right rotation WFL            Flexibility testing:   - hamstrings:         B: WNL, R: -20 deg, L: -40 deg             - gastrocnemius:   B: WNL, R: 10 deg, L: 15 deg          MMT R L   Hip flexion 5/5 5/5   Hip abduction 5/5 5/5   Hip extension 5/5 5/5   Hip ER 5/5 5/5   Hip IR 5/5 5/5   Knee extension 5/5 5/5   Knee flexion 5/5 5/5   Ankle dorsiflexion 5/5 5/5   Ankle plantar flexion 5/5 5/5   Ankle inversion 5/5 5/5   Ankle eversion 5/5 5/5            CMS Impairment/Limitation/Restriction for FOTO Lumbar Survey     Therapist reviewed FOTO scores for Nakia Jaime on " 11/30/2020  .   FOTO documents entered into [employer] - see Media section.     Limitation Score: 17%  Category: Other     Goal: CJ = at least 20% but < 40% impaired, limited or restricted  Discharge: CI at least 1% but less than 20% impaired, limited or restricted        Nakia received therapeutic exercises to develop strength, ROM, flexibility, posture and core stabilization for 28 minutes including:  Reassessed strength, flexibility, ROM, and G code    Education provided:      Bird dogs  planks    Written Home Exercises Provided:yes and  Patient instructed to cont prior HEP.  Exercises were reviewed and Nakia was able to demonstrate them prior to the end of the session.  Nakia demonstrated good  understanding of the education provided.     See EMR under Patient Instructions for exercises provided 11/30/2020 and prior visit.      Assessment    Patient has progressed well in OP PT. Has achieved below listed goals. May experience brief pain with intensity over 3/10, but pain quickly dissappates,      Goals:   Short Term Goals: 4 weeks   1. Independent with HEP. (MET 10/13/2020 )  2. Report decreased lumbar pain < or =  6/10 with adls such as sitting, standing, bending, lifting and negotiating steps while carrying something. (MET 10/13/2020 )  3. Increased MMT for B LE by 1 muscle grade to promote proper pelvic stability to decrease  lumbar pain < or =  6/10 with adls such as sitting, standing, bending, lifting and negotiating steps while carrying something. (progressing, not met)     Long Term Goals: 8 weeks   4. Report decreased lumbar pain < or =  3/10 with adls such as sitting, standing, bending, lifting and negotiating steps while carrying something. (MET 10/13/2020 )  5. Increased MMT for B LE to 5/5 muscle grade to promote proper pelvic stability to decrease lumbar pain < or =  3/10 with adls such as sitting, standing, bending, lifting and negotiating steps while carrying something.  (met)  6. Increased  flexibility in B hamstrings to -20 deg with 90/90 test to promote proper pelvic stability to decrease lumbar pain < or =  3/10 with adls such as sitting, standing, bending, lifting and negotiating steps while carrying something. (met)  7. Increased flexibility in B IT bands to promote proper pelvic stability to decrease lumbar pain < or =  3/10 with adls such as sitting, standing, bending, lifting and negotiating steps while carrying something.   (met)  8. Patient to achieve CJ at least 20% < 40% impaired, limited or restricted level on the FOTO Outcomes Measurement System. (MET 10/13/2020 )      Discharge reason: Patient has met all of her goals    Plan   This patient is discharged from Physical Therapy

## 2020-11-30 NOTE — PATIENT INSTRUCTIONS
Plank on elbows          Lie face down and press up onto your elbows and toes with your body in a straight line. Press your shoulders away from the floor and squeeze your glutes to tuck your tailbone underneath you. Don't let your hips raise up above or fall below the line between your shoulders and feet. Hold this position.    Hold for 30 seconds. Perform 3 reps.    Copyright © 7903-6873 [employer]    QUADRUPED ALTERNATE ARM AND LEG - BIRD DOG          While in a crawling position, brace at your abdominals and then slowly lift a leg and opposite arm upwards. Lower leg and arm down and then repeat with opposite side.    Maintain a level and stable pelvis and spine the entire time.    Copyright © 8547-4028 [employer]

## 2021-01-05 ENCOUNTER — PATIENT MESSAGE (OUTPATIENT)
Dept: ADMINISTRATIVE | Facility: HOSPITAL | Age: 36
End: 2021-01-05

## 2021-01-25 ENCOUNTER — OFFICE VISIT (OUTPATIENT)
Dept: PSYCHIATRY | Facility: CLINIC | Age: 36
End: 2021-01-25
Payer: COMMERCIAL

## 2021-01-25 VITALS
BODY MASS INDEX: 26.79 KG/M2 | SYSTOLIC BLOOD PRESSURE: 107 MMHG | DIASTOLIC BLOOD PRESSURE: 53 MMHG | WEIGHT: 146.5 LBS | HEART RATE: 65 BPM

## 2021-01-25 DIAGNOSIS — F32.A DEPRESSION, UNSPECIFIED DEPRESSION TYPE: Primary | ICD-10-CM

## 2021-01-25 DIAGNOSIS — F34.1 DYSTHYMIA: ICD-10-CM

## 2021-01-25 PROCEDURE — 99999 PR PBB SHADOW E&M-EST. PATIENT-LVL II: ICD-10-PCS | Mod: PBBFAC,,, | Performed by: STUDENT IN AN ORGANIZED HEALTH CARE EDUCATION/TRAINING PROGRAM

## 2021-01-25 PROCEDURE — 99999 PR PBB SHADOW E&M-EST. PATIENT-LVL II: CPT | Mod: PBBFAC,,, | Performed by: STUDENT IN AN ORGANIZED HEALTH CARE EDUCATION/TRAINING PROGRAM

## 2021-01-25 PROCEDURE — 99205 OFFICE O/P NEW HI 60 MIN: CPT | Mod: S$GLB,,, | Performed by: STUDENT IN AN ORGANIZED HEALTH CARE EDUCATION/TRAINING PROGRAM

## 2021-01-25 PROCEDURE — 99205 PR OFFICE/OUTPT VISIT, NEW, LEVL V, 60-74 MIN: ICD-10-PCS | Mod: S$GLB,,, | Performed by: STUDENT IN AN ORGANIZED HEALTH CARE EDUCATION/TRAINING PROGRAM

## 2021-01-25 RX ORDER — DULOXETIN HYDROCHLORIDE 30 MG/1
30 CAPSULE, DELAYED RELEASE ORAL DAILY
Qty: 30 CAPSULE | Refills: 0 | Status: SHIPPED | OUTPATIENT
Start: 2021-01-25 | End: 2021-02-22 | Stop reason: SDUPTHER

## 2021-02-22 ENCOUNTER — PATIENT MESSAGE (OUTPATIENT)
Dept: PSYCHIATRY | Facility: CLINIC | Age: 36
End: 2021-02-22

## 2021-02-22 ENCOUNTER — OFFICE VISIT (OUTPATIENT)
Dept: PSYCHIATRY | Facility: CLINIC | Age: 36
End: 2021-02-22
Payer: COMMERCIAL

## 2021-02-22 VITALS
DIASTOLIC BLOOD PRESSURE: 58 MMHG | HEART RATE: 70 BPM | BODY MASS INDEX: 25.89 KG/M2 | SYSTOLIC BLOOD PRESSURE: 115 MMHG | WEIGHT: 141.56 LBS

## 2021-02-22 DIAGNOSIS — F32.A DEPRESSION, UNSPECIFIED DEPRESSION TYPE: Primary | ICD-10-CM

## 2021-02-22 PROCEDURE — 99999 PR PBB SHADOW E&M-EST. PATIENT-LVL II: CPT | Mod: PBBFAC,,, | Performed by: STUDENT IN AN ORGANIZED HEALTH CARE EDUCATION/TRAINING PROGRAM

## 2021-02-22 PROCEDURE — 99214 OFFICE O/P EST MOD 30 MIN: CPT | Mod: S$GLB,,, | Performed by: STUDENT IN AN ORGANIZED HEALTH CARE EDUCATION/TRAINING PROGRAM

## 2021-02-22 PROCEDURE — 99214 PR OFFICE/OUTPT VISIT, EST, LEVL IV, 30-39 MIN: ICD-10-PCS | Mod: S$GLB,,, | Performed by: STUDENT IN AN ORGANIZED HEALTH CARE EDUCATION/TRAINING PROGRAM

## 2021-02-22 PROCEDURE — 99999 PR PBB SHADOW E&M-EST. PATIENT-LVL II: ICD-10-PCS | Mod: PBBFAC,,, | Performed by: STUDENT IN AN ORGANIZED HEALTH CARE EDUCATION/TRAINING PROGRAM

## 2021-02-22 RX ORDER — DULOXETIN HYDROCHLORIDE 30 MG/1
30 CAPSULE, DELAYED RELEASE ORAL DAILY
Qty: 30 CAPSULE | Refills: 0 | Status: SHIPPED | OUTPATIENT
Start: 2021-02-22 | End: 2021-04-27 | Stop reason: SDUPTHER

## 2021-03-08 ENCOUNTER — OFFICE VISIT (OUTPATIENT)
Dept: PSYCHIATRY | Facility: CLINIC | Age: 36
End: 2021-03-08
Payer: COMMERCIAL

## 2021-03-08 DIAGNOSIS — F33.0 MILD EPISODE OF RECURRENT MAJOR DEPRESSIVE DISORDER: Primary | ICD-10-CM

## 2021-03-08 PROCEDURE — 90791 PR PSYCHIATRIC DIAGNOSTIC EVALUATION: ICD-10-PCS | Mod: 95,,, | Performed by: SOCIAL WORKER

## 2021-03-08 PROCEDURE — 90791 PSYCH DIAGNOSTIC EVALUATION: CPT | Mod: 95,,, | Performed by: SOCIAL WORKER

## 2021-03-15 ENCOUNTER — OFFICE VISIT (OUTPATIENT)
Dept: PSYCHIATRY | Facility: CLINIC | Age: 36
End: 2021-03-15
Payer: COMMERCIAL

## 2021-03-15 DIAGNOSIS — F32.A DEPRESSION, UNSPECIFIED DEPRESSION TYPE: Primary | ICD-10-CM

## 2021-03-15 PROCEDURE — 90834 PR PSYCHOTHERAPY W/PATIENT, 45 MIN: ICD-10-PCS | Mod: 95,,, | Performed by: SOCIAL WORKER

## 2021-03-15 PROCEDURE — 90834 PSYTX W PT 45 MINUTES: CPT | Mod: 95,,, | Performed by: SOCIAL WORKER

## 2021-03-23 ENCOUNTER — OFFICE VISIT (OUTPATIENT)
Dept: PSYCHIATRY | Facility: CLINIC | Age: 36
End: 2021-03-23
Payer: COMMERCIAL

## 2021-03-23 DIAGNOSIS — Z63.9 RELATIONSHIP PROBLEMS: ICD-10-CM

## 2021-03-23 DIAGNOSIS — F33.0 MILD EPISODE OF RECURRENT MAJOR DEPRESSIVE DISORDER: Primary | ICD-10-CM

## 2021-03-23 PROCEDURE — 90847 FAMILY PSYTX W/PT 50 MIN: CPT | Mod: 95,,, | Performed by: SOCIAL WORKER

## 2021-03-23 PROCEDURE — 90847 PR FAMILY PSYCHOTHERAPY W/ PT, 50 MIN: ICD-10-PCS | Mod: 95,,, | Performed by: SOCIAL WORKER

## 2021-03-23 SDOH — SOCIAL DETERMINANTS OF HEALTH (SDOH): PROBLEM RELATED TO PRIMARY SUPPORT GROUP, UNSPECIFIED: Z63.9

## 2021-03-25 PROBLEM — Z63.9 RELATIONSHIP PROBLEMS: Status: ACTIVE | Noted: 2021-03-25

## 2021-04-05 ENCOUNTER — PATIENT MESSAGE (OUTPATIENT)
Dept: ADMINISTRATIVE | Facility: HOSPITAL | Age: 36
End: 2021-04-05

## 2021-04-05 ENCOUNTER — OFFICE VISIT (OUTPATIENT)
Dept: PSYCHIATRY | Facility: CLINIC | Age: 36
End: 2021-04-05
Payer: COMMERCIAL

## 2021-04-05 DIAGNOSIS — F33.0 MILD EPISODE OF RECURRENT MAJOR DEPRESSIVE DISORDER: Primary | ICD-10-CM

## 2021-04-05 PROCEDURE — 90847 PR FAMILY PSYCHOTHERAPY W/ PT, 50 MIN: ICD-10-PCS | Mod: 95,,, | Performed by: SOCIAL WORKER

## 2021-04-05 PROCEDURE — 90847 FAMILY PSYTX W/PT 50 MIN: CPT | Mod: 95,,, | Performed by: SOCIAL WORKER

## 2021-04-16 ENCOUNTER — PATIENT MESSAGE (OUTPATIENT)
Dept: RESEARCH | Facility: HOSPITAL | Age: 36
End: 2021-04-16

## 2021-04-27 ENCOUNTER — OFFICE VISIT (OUTPATIENT)
Dept: PSYCHIATRY | Facility: CLINIC | Age: 36
End: 2021-04-27
Payer: COMMERCIAL

## 2021-04-27 VITALS
DIASTOLIC BLOOD PRESSURE: 56 MMHG | SYSTOLIC BLOOD PRESSURE: 96 MMHG | BODY MASS INDEX: 26.41 KG/M2 | WEIGHT: 144.38 LBS | HEART RATE: 58 BPM

## 2021-04-27 DIAGNOSIS — F32.A DEPRESSION, UNSPECIFIED DEPRESSION TYPE: Primary | ICD-10-CM

## 2021-04-27 PROCEDURE — 99999 PR PBB SHADOW E&M-EST. PATIENT-LVL II: ICD-10-PCS | Mod: PBBFAC,,, | Performed by: STUDENT IN AN ORGANIZED HEALTH CARE EDUCATION/TRAINING PROGRAM

## 2021-04-27 PROCEDURE — 99999 PR PBB SHADOW E&M-EST. PATIENT-LVL II: CPT | Mod: PBBFAC,,, | Performed by: STUDENT IN AN ORGANIZED HEALTH CARE EDUCATION/TRAINING PROGRAM

## 2021-04-27 PROCEDURE — 99214 PR OFFICE/OUTPT VISIT, EST, LEVL IV, 30-39 MIN: ICD-10-PCS | Mod: S$GLB,,, | Performed by: STUDENT IN AN ORGANIZED HEALTH CARE EDUCATION/TRAINING PROGRAM

## 2021-04-27 PROCEDURE — 99214 OFFICE O/P EST MOD 30 MIN: CPT | Mod: S$GLB,,, | Performed by: STUDENT IN AN ORGANIZED HEALTH CARE EDUCATION/TRAINING PROGRAM

## 2021-04-27 RX ORDER — DULOXETIN HYDROCHLORIDE 30 MG/1
CAPSULE, DELAYED RELEASE ORAL
Qty: 46 CAPSULE | Refills: 0 | Status: SHIPPED | OUTPATIENT
Start: 2021-04-27 | End: 2021-05-31 | Stop reason: SDUPTHER

## 2021-05-11 ENCOUNTER — OFFICE VISIT (OUTPATIENT)
Dept: PSYCHIATRY | Facility: CLINIC | Age: 36
End: 2021-05-11
Payer: COMMERCIAL

## 2021-05-11 DIAGNOSIS — F33.0 MILD EPISODE OF RECURRENT MAJOR DEPRESSIVE DISORDER: Primary | ICD-10-CM

## 2021-05-11 PROCEDURE — 90837 PR PSYCHOTHERAPY W/PATIENT, 60 MIN: ICD-10-PCS | Mod: 95,,, | Performed by: SOCIAL WORKER

## 2021-05-11 PROCEDURE — 90837 PSYTX W PT 60 MINUTES: CPT | Mod: 95,,, | Performed by: SOCIAL WORKER

## 2021-05-12 ENCOUNTER — PATIENT MESSAGE (OUTPATIENT)
Dept: INTERNAL MEDICINE | Facility: CLINIC | Age: 36
End: 2021-05-12

## 2021-05-12 RX ORDER — VALACYCLOVIR HYDROCHLORIDE 500 MG/1
500 TABLET, FILM COATED ORAL 2 TIMES DAILY
Qty: 30 TABLET | Refills: 3 | Status: SHIPPED | OUTPATIENT
Start: 2021-05-12 | End: 2022-02-14 | Stop reason: SDUPTHER

## 2021-05-31 ENCOUNTER — OFFICE VISIT (OUTPATIENT)
Dept: PSYCHIATRY | Facility: CLINIC | Age: 36
End: 2021-05-31
Payer: COMMERCIAL

## 2021-05-31 VITALS
WEIGHT: 143.63 LBS | HEART RATE: 53 BPM | BODY MASS INDEX: 26.27 KG/M2 | DIASTOLIC BLOOD PRESSURE: 53 MMHG | SYSTOLIC BLOOD PRESSURE: 102 MMHG

## 2021-05-31 DIAGNOSIS — F33.0 MILD EPISODE OF RECURRENT MAJOR DEPRESSIVE DISORDER: Primary | ICD-10-CM

## 2021-05-31 PROCEDURE — 99214 PR OFFICE/OUTPT VISIT, EST, LEVL IV, 30-39 MIN: ICD-10-PCS | Mod: S$GLB,,, | Performed by: STUDENT IN AN ORGANIZED HEALTH CARE EDUCATION/TRAINING PROGRAM

## 2021-05-31 PROCEDURE — 99999 PR PBB SHADOW E&M-EST. PATIENT-LVL I: ICD-10-PCS | Mod: PBBFAC,,, | Performed by: STUDENT IN AN ORGANIZED HEALTH CARE EDUCATION/TRAINING PROGRAM

## 2021-05-31 PROCEDURE — 99999 PR PBB SHADOW E&M-EST. PATIENT-LVL I: CPT | Mod: PBBFAC,,, | Performed by: STUDENT IN AN ORGANIZED HEALTH CARE EDUCATION/TRAINING PROGRAM

## 2021-05-31 PROCEDURE — 99214 OFFICE O/P EST MOD 30 MIN: CPT | Mod: S$GLB,,, | Performed by: STUDENT IN AN ORGANIZED HEALTH CARE EDUCATION/TRAINING PROGRAM

## 2021-05-31 RX ORDER — DULOXETIN HYDROCHLORIDE 60 MG/1
60 CAPSULE, DELAYED RELEASE ORAL DAILY
Qty: 30 CAPSULE | Refills: 2 | Status: SHIPPED | OUTPATIENT
Start: 2021-05-31 | End: 2021-11-01 | Stop reason: SDUPTHER

## 2021-06-18 ENCOUNTER — PATIENT MESSAGE (OUTPATIENT)
Dept: INTERNAL MEDICINE | Facility: CLINIC | Age: 36
End: 2021-06-18

## 2021-07-12 ENCOUNTER — OFFICE VISIT (OUTPATIENT)
Dept: INTERNAL MEDICINE | Facility: CLINIC | Age: 36
End: 2021-07-12
Attending: FAMILY MEDICINE
Payer: COMMERCIAL

## 2021-07-12 VITALS
HEIGHT: 62 IN | SYSTOLIC BLOOD PRESSURE: 136 MMHG | WEIGHT: 143.06 LBS | HEART RATE: 76 BPM | OXYGEN SATURATION: 97 % | BODY MASS INDEX: 26.33 KG/M2 | DIASTOLIC BLOOD PRESSURE: 65 MMHG

## 2021-07-12 DIAGNOSIS — R06.00 DYSPNEA, UNSPECIFIED TYPE: Primary | ICD-10-CM

## 2021-07-12 PROCEDURE — 99214 PR OFFICE/OUTPT VISIT, EST, LEVL IV, 30-39 MIN: ICD-10-PCS | Mod: S$GLB,,, | Performed by: FAMILY MEDICINE

## 2021-07-12 PROCEDURE — 3008F BODY MASS INDEX DOCD: CPT | Mod: CPTII,S$GLB,, | Performed by: FAMILY MEDICINE

## 2021-07-12 PROCEDURE — 99999 PR PBB SHADOW E&M-EST. PATIENT-LVL III: CPT | Mod: PBBFAC,,, | Performed by: FAMILY MEDICINE

## 2021-07-12 PROCEDURE — 99999 PR PBB SHADOW E&M-EST. PATIENT-LVL III: ICD-10-PCS | Mod: PBBFAC,,, | Performed by: FAMILY MEDICINE

## 2021-07-12 PROCEDURE — 3008F PR BODY MASS INDEX (BMI) DOCUMENTED: ICD-10-PCS | Mod: CPTII,S$GLB,, | Performed by: FAMILY MEDICINE

## 2021-07-12 PROCEDURE — 99214 OFFICE O/P EST MOD 30 MIN: CPT | Mod: S$GLB,,, | Performed by: FAMILY MEDICINE

## 2021-07-12 RX ORDER — ALBUTEROL SULFATE 90 UG/1
2 AEROSOL, METERED RESPIRATORY (INHALATION) EVERY 6 HOURS PRN
Qty: 6.7 G | Refills: 5 | Status: SHIPPED | OUTPATIENT
Start: 2021-07-12 | End: 2022-11-08

## 2021-07-30 ENCOUNTER — PATIENT OUTREACH (OUTPATIENT)
Dept: ADMINISTRATIVE | Facility: OTHER | Age: 36
End: 2021-07-30

## 2021-08-02 DIAGNOSIS — R06.02 SOB (SHORTNESS OF BREATH): Primary | ICD-10-CM

## 2021-08-03 ENCOUNTER — HOSPITAL ENCOUNTER (OUTPATIENT)
Dept: RADIOLOGY | Facility: HOSPITAL | Age: 36
Discharge: HOME | End: 2021-08-03
Attending: NURSE PRACTITIONER
Payer: COMMERCIAL

## 2021-08-03 ENCOUNTER — OFFICE VISIT (OUTPATIENT)
Dept: PULMONOLOGY | Facility: CLINIC | Age: 36
End: 2021-08-03
Payer: COMMERCIAL

## 2021-08-03 ENCOUNTER — HOSPITAL ENCOUNTER (OUTPATIENT)
Dept: PULMONOLOGY | Facility: CLINIC | Age: 36
Discharge: HOME | End: 2021-08-03
Payer: COMMERCIAL

## 2021-08-03 VITALS
WEIGHT: 143.94 LBS | OXYGEN SATURATION: 99 % | DIASTOLIC BLOOD PRESSURE: 60 MMHG | HEIGHT: 62 IN | HEART RATE: 78 BPM | SYSTOLIC BLOOD PRESSURE: 130 MMHG | BODY MASS INDEX: 26.49 KG/M2

## 2021-08-03 DIAGNOSIS — R06.02 SOB (SHORTNESS OF BREATH): ICD-10-CM

## 2021-08-03 DIAGNOSIS — R09.81 NASAL CONGESTION: ICD-10-CM

## 2021-08-03 DIAGNOSIS — R06.00 DYSPNEA, UNSPECIFIED TYPE: Primary | ICD-10-CM

## 2021-08-03 DIAGNOSIS — K21.9 GASTROESOPHAGEAL REFLUX DISEASE WITHOUT ESOPHAGITIS: ICD-10-CM

## 2021-08-03 DIAGNOSIS — R06.00 DYSPNEA, UNSPECIFIED TYPE: ICD-10-CM

## 2021-08-03 DIAGNOSIS — Z01.818 PRE-PROCEDURAL EXAMINATION: Primary | ICD-10-CM

## 2021-08-03 LAB — SARS-COV-2 RDRP RESP QL NAA+PROBE: NEGATIVE

## 2021-08-03 PROCEDURE — 94010 BREATHING CAPACITY TEST: CPT | Mod: S$GLB,,, | Performed by: INTERNAL MEDICINE

## 2021-08-03 PROCEDURE — 99999 PR PBB SHADOW E&M-EST. PATIENT-LVL IV: CPT | Mod: PBBFAC,,, | Performed by: NURSE PRACTITIONER

## 2021-08-03 PROCEDURE — 3008F BODY MASS INDEX DOCD: CPT | Mod: CPTII,S$GLB,, | Performed by: NURSE PRACTITIONER

## 2021-08-03 PROCEDURE — 3008F PR BODY MASS INDEX (BMI) DOCUMENTED: ICD-10-PCS | Mod: CPTII,S$GLB,, | Performed by: NURSE PRACTITIONER

## 2021-08-03 PROCEDURE — 3075F SYST BP GE 130 - 139MM HG: CPT | Mod: CPTII,S$GLB,, | Performed by: NURSE PRACTITIONER

## 2021-08-03 PROCEDURE — U0002 COVID-19 LAB TEST NON-CDC: HCPCS | Performed by: NURSE PRACTITIONER

## 2021-08-03 PROCEDURE — 3075F PR MOST RECENT SYSTOLIC BLOOD PRESS GE 130-139MM HG: ICD-10-PCS | Mod: CPTII,S$GLB,, | Performed by: NURSE PRACTITIONER

## 2021-08-03 PROCEDURE — 3078F PR MOST RECENT DIASTOLIC BLOOD PRESSURE < 80 MM HG: ICD-10-PCS | Mod: CPTII,S$GLB,, | Performed by: NURSE PRACTITIONER

## 2021-08-03 PROCEDURE — 99204 OFFICE O/P NEW MOD 45 MIN: CPT | Mod: S$GLB,,, | Performed by: NURSE PRACTITIONER

## 2021-08-03 PROCEDURE — 3078F DIAST BP <80 MM HG: CPT | Mod: CPTII,S$GLB,, | Performed by: NURSE PRACTITIONER

## 2021-08-03 PROCEDURE — 71046 X-RAY EXAM CHEST 2 VIEWS: CPT | Mod: TC,FY

## 2021-08-03 PROCEDURE — 1160F RVW MEDS BY RX/DR IN RCRD: CPT | Mod: CPTII,S$GLB,, | Performed by: NURSE PRACTITIONER

## 2021-08-03 PROCEDURE — 1126F AMNT PAIN NOTED NONE PRSNT: CPT | Mod: CPTII,S$GLB,, | Performed by: NURSE PRACTITIONER

## 2021-08-03 PROCEDURE — 1159F MED LIST DOCD IN RCRD: CPT | Mod: CPTII,S$GLB,, | Performed by: NURSE PRACTITIONER

## 2021-08-03 PROCEDURE — 1160F PR REVIEW ALL MEDS BY PRESCRIBER/CLIN PHARMACIST DOCUMENTED: ICD-10-PCS | Mod: CPTII,S$GLB,, | Performed by: NURSE PRACTITIONER

## 2021-08-03 PROCEDURE — 94729 PR C02/MEMBANE DIFFUSE CAPACITY: ICD-10-PCS | Mod: S$GLB,,, | Performed by: INTERNAL MEDICINE

## 2021-08-03 PROCEDURE — 94010 BREATHING CAPACITY TEST: ICD-10-PCS | Mod: S$GLB,,, | Performed by: INTERNAL MEDICINE

## 2021-08-03 PROCEDURE — 1159F PR MEDICATION LIST DOCUMENTED IN MEDICAL RECORD: ICD-10-PCS | Mod: CPTII,S$GLB,, | Performed by: NURSE PRACTITIONER

## 2021-08-03 PROCEDURE — 71046 XR CHEST PA AND LATERAL: ICD-10-PCS | Mod: 26,,, | Performed by: RADIOLOGY

## 2021-08-03 PROCEDURE — 71046 X-RAY EXAM CHEST 2 VIEWS: CPT | Mod: 26,,, | Performed by: RADIOLOGY

## 2021-08-03 PROCEDURE — 94729 DIFFUSING CAPACITY: CPT | Mod: S$GLB,,, | Performed by: INTERNAL MEDICINE

## 2021-08-03 PROCEDURE — 99999 PR PBB SHADOW E&M-EST. PATIENT-LVL IV: ICD-10-PCS | Mod: PBBFAC,,, | Performed by: NURSE PRACTITIONER

## 2021-08-03 PROCEDURE — 99204 PR OFFICE/OUTPT VISIT, NEW, LEVL IV, 45-59 MIN: ICD-10-PCS | Mod: S$GLB,,, | Performed by: NURSE PRACTITIONER

## 2021-08-03 PROCEDURE — 1126F PR PAIN SEVERITY QUANTIFIED, NO PAIN PRESENT: ICD-10-PCS | Mod: CPTII,S$GLB,, | Performed by: NURSE PRACTITIONER

## 2021-08-03 RX ORDER — CELECOXIB 200 MG/1
200 CAPSULE ORAL DAILY
COMMUNITY
Start: 2021-06-16 | End: 2021-09-14 | Stop reason: SDUPTHER

## 2021-08-05 ENCOUNTER — PATIENT MESSAGE (OUTPATIENT)
Dept: PULMONOLOGY | Facility: CLINIC | Age: 36
End: 2021-08-05

## 2021-08-05 ENCOUNTER — TELEPHONE (OUTPATIENT)
Dept: PULMONOLOGY | Facility: CLINIC | Age: 36
End: 2021-08-05

## 2021-08-09 PROBLEM — R09.81 NASAL CONGESTION: Status: ACTIVE | Noted: 2021-08-09

## 2021-08-09 PROBLEM — K21.9 GASTROESOPHAGEAL REFLUX DISEASE WITHOUT ESOPHAGITIS: Status: ACTIVE | Noted: 2021-08-09

## 2021-08-10 LAB
DLCO SINGLE BREATH LLN: 19.58
DLCO SINGLE BREATH PRE REF: 75.5 %
DLCO SINGLE BREATH REF: 25.31
DLCOC SBVA LLN: 3.78
DLCOC SBVA REF: 5.5
DLCOC SINGLE BREATH LLN: 19.58
DLCOC SINGLE BREATH REF: 25.31
DLCOCSBVAULN: 7.21
DLCOCSINGLEBREATHULN: 31.04
DLCOSINGLEBREATHULN: 31.04
DLCOVA LLN: 3.78
DLCOVA PRE REF: 86.6 %
DLCOVA PRE: 4.76 ML/(MIN*MMHG*L) (ref 3.78–7.21)
DLCOVA REF: 5.5
DLCOVAULN: 7.21
FEF 25 75 LLN: 2.06
FEF 25 75 PRE REF: 126.6 %
FEF 25 75 REF: 3.26
FEV05 LLN: 1.25
FEV05 REF: 2.11
FEV1 FVC LLN: 73
FEV1 FVC PRE REF: 101.1 %
FEV1 FVC REF: 84
FEV1 LLN: 2.35
FEV1 PRE REF: 114.9 %
FEV1 REF: 2.94
FVC LLN: 2.8
FVC PRE REF: 113.1 %
FVC REF: 3.51
IVC PRE: 3.85 L (ref 2.8–4.23)
IVC SINGLE BREATH LLN: 2.8
IVC SINGLE BREATH PRE REF: 109.7 %
IVC SINGLE BREATH REF: 3.51
IVCSINGLEBREATHULN: 4.23
PEF LLN: 5.1
PEF PRE REF: 115.8 %
PEF REF: 6.71
PHYSICIAN COMMENT: ABNORMAL
PRE DLCO: 19.11 ML/(MIN*MMHG) (ref 19.58–31.04)
PRE FEF 25 75: 4.13 L/S (ref 2.06–4.69)
PRE FET 100: 7.84 SEC
PRE FEV05 REF: 129.8 %
PRE FEV1 FVC: 85.07 % (ref 72.77–93.25)
PRE FEV1: 3.37 L (ref 2.35–3.5)
PRE FEV5: 2.74 L (ref 1.25–2.97)
PRE FVC: 3.97 L (ref 2.8–4.23)
PRE PEF: 7.77 L/S (ref 5.1–8.32)
VA PRE: 4.01 L (ref 4.45–4.45)
VA SINGLE BREATH PRE REF: 90.1 %
VA SINGLE BREATH REF: 4.45

## 2021-08-13 ENCOUNTER — PATIENT MESSAGE (OUTPATIENT)
Dept: INTERNAL MEDICINE | Facility: CLINIC | Age: 36
End: 2021-08-13

## 2021-09-14 RX ORDER — CELECOXIB 200 MG/1
200 CAPSULE ORAL DAILY
Qty: 90 CAPSULE | Refills: 1 | Status: SHIPPED | OUTPATIENT
Start: 2021-09-14 | End: 2022-03-14 | Stop reason: SDUPTHER

## 2021-10-05 ENCOUNTER — PATIENT MESSAGE (OUTPATIENT)
Dept: ADMINISTRATIVE | Facility: HOSPITAL | Age: 36
End: 2021-10-05

## 2021-10-25 ENCOUNTER — PATIENT MESSAGE (OUTPATIENT)
Dept: PSYCHIATRY | Facility: CLINIC | Age: 36
End: 2021-10-25
Payer: COMMERCIAL

## 2021-11-10 ENCOUNTER — OFFICE VISIT (OUTPATIENT)
Dept: PSYCHIATRY | Facility: CLINIC | Age: 36
End: 2021-11-10
Payer: COMMERCIAL

## 2021-11-10 VITALS
SYSTOLIC BLOOD PRESSURE: 112 MMHG | BODY MASS INDEX: 24.5 KG/M2 | WEIGHT: 133.94 LBS | DIASTOLIC BLOOD PRESSURE: 53 MMHG | HEART RATE: 75 BPM

## 2021-11-10 DIAGNOSIS — F33.0 MILD EPISODE OF RECURRENT MAJOR DEPRESSIVE DISORDER: Primary | ICD-10-CM

## 2021-11-10 DIAGNOSIS — F12.90 MARIJUANA SMOKER, CONTINUOUS: ICD-10-CM

## 2021-11-10 PROCEDURE — 3078F DIAST BP <80 MM HG: CPT | Mod: CPTII,S$GLB,, | Performed by: NURSE PRACTITIONER

## 2021-11-10 PROCEDURE — 1160F RVW MEDS BY RX/DR IN RCRD: CPT | Mod: CPTII,S$GLB,, | Performed by: NURSE PRACTITIONER

## 2021-11-10 PROCEDURE — 3078F PR MOST RECENT DIASTOLIC BLOOD PRESSURE < 80 MM HG: ICD-10-PCS | Mod: CPTII,S$GLB,, | Performed by: NURSE PRACTITIONER

## 2021-11-10 PROCEDURE — 3074F PR MOST RECENT SYSTOLIC BLOOD PRESSURE < 130 MM HG: ICD-10-PCS | Mod: CPTII,S$GLB,, | Performed by: NURSE PRACTITIONER

## 2021-11-10 PROCEDURE — 1160F PR REVIEW ALL MEDS BY PRESCRIBER/CLIN PHARMACIST DOCUMENTED: ICD-10-PCS | Mod: CPTII,S$GLB,, | Performed by: NURSE PRACTITIONER

## 2021-11-10 PROCEDURE — 99999 PR PBB SHADOW E&M-EST. PATIENT-LVL III: ICD-10-PCS | Mod: PBBFAC,,, | Performed by: NURSE PRACTITIONER

## 2021-11-10 PROCEDURE — 99214 OFFICE O/P EST MOD 30 MIN: CPT | Mod: S$GLB,,, | Performed by: NURSE PRACTITIONER

## 2021-11-10 PROCEDURE — 90833 PR PSYCHOTHERAPY W/PATIENT W/E&M, 30 MIN (ADD ON): ICD-10-PCS | Mod: S$GLB,,, | Performed by: NURSE PRACTITIONER

## 2021-11-10 PROCEDURE — 3074F SYST BP LT 130 MM HG: CPT | Mod: CPTII,S$GLB,, | Performed by: NURSE PRACTITIONER

## 2021-11-10 PROCEDURE — 1159F MED LIST DOCD IN RCRD: CPT | Mod: CPTII,S$GLB,, | Performed by: NURSE PRACTITIONER

## 2021-11-10 PROCEDURE — 3008F PR BODY MASS INDEX (BMI) DOCUMENTED: ICD-10-PCS | Mod: CPTII,S$GLB,, | Performed by: NURSE PRACTITIONER

## 2021-11-10 PROCEDURE — 99999 PR PBB SHADOW E&M-EST. PATIENT-LVL III: CPT | Mod: PBBFAC,,, | Performed by: NURSE PRACTITIONER

## 2021-11-10 PROCEDURE — 1159F PR MEDICATION LIST DOCUMENTED IN MEDICAL RECORD: ICD-10-PCS | Mod: CPTII,S$GLB,, | Performed by: NURSE PRACTITIONER

## 2021-11-10 PROCEDURE — 99214 PR OFFICE/OUTPT VISIT, EST, LEVL IV, 30-39 MIN: ICD-10-PCS | Mod: S$GLB,,, | Performed by: NURSE PRACTITIONER

## 2021-11-10 PROCEDURE — 90833 PSYTX W PT W E/M 30 MIN: CPT | Mod: S$GLB,,, | Performed by: NURSE PRACTITIONER

## 2021-11-10 PROCEDURE — 3008F BODY MASS INDEX DOCD: CPT | Mod: CPTII,S$GLB,, | Performed by: NURSE PRACTITIONER

## 2021-11-10 RX ORDER — DULOXETIN HYDROCHLORIDE 60 MG/1
60 CAPSULE, DELAYED RELEASE ORAL DAILY
Qty: 30 CAPSULE | Refills: 3 | Status: SHIPPED | OUTPATIENT
Start: 2021-11-10 | End: 2022-02-14 | Stop reason: SDUPTHER

## 2021-11-12 ENCOUNTER — PATIENT MESSAGE (OUTPATIENT)
Dept: INTERNAL MEDICINE | Facility: CLINIC | Age: 36
End: 2021-11-12
Payer: COMMERCIAL

## 2021-11-12 DIAGNOSIS — L72.0 EPIDERMAL INCLUSION CYST: Primary | ICD-10-CM

## 2021-11-22 ENCOUNTER — OFFICE VISIT (OUTPATIENT)
Dept: DERMATOLOGY | Facility: CLINIC | Age: 36
End: 2021-11-22
Payer: COMMERCIAL

## 2021-11-22 DIAGNOSIS — R22.2 SUBCUTANEOUS NODULE OF BACK: Primary | ICD-10-CM

## 2021-11-22 PROCEDURE — 99203 OFFICE O/P NEW LOW 30 MIN: CPT | Mod: 25,S$GLB,, | Performed by: DERMATOLOGY

## 2021-11-22 PROCEDURE — 11104 PUNCH BX SKIN SINGLE LESION: CPT | Mod: S$GLB,,, | Performed by: INTERNAL MEDICINE

## 2021-11-22 PROCEDURE — 88305 TISSUE EXAM BY PATHOLOGIST: CPT | Performed by: PATHOLOGY

## 2021-11-22 PROCEDURE — 11104 PR PUNCH BIOPSY, SKIN, SINGLE LESION: ICD-10-PCS | Mod: S$GLB,,, | Performed by: INTERNAL MEDICINE

## 2021-11-22 PROCEDURE — 99203 PR OFFICE/OUTPT VISIT, NEW, LEVL III, 30-44 MIN: ICD-10-PCS | Mod: 25,S$GLB,, | Performed by: DERMATOLOGY

## 2021-11-22 PROCEDURE — 88305 TISSUE EXAM BY PATHOLOGIST: ICD-10-PCS | Mod: 26,,, | Performed by: PATHOLOGY

## 2021-11-22 PROCEDURE — 88305 TISSUE EXAM BY PATHOLOGIST: CPT | Mod: 26,,, | Performed by: PATHOLOGY

## 2021-11-22 PROCEDURE — 99999 PR PBB SHADOW E&M-EST. PATIENT-LVL III: ICD-10-PCS | Mod: PBBFAC,,,

## 2021-11-22 PROCEDURE — 99999 PR PBB SHADOW E&M-EST. PATIENT-LVL III: CPT | Mod: PBBFAC,,,

## 2021-11-29 LAB
FINAL PATHOLOGIC DIAGNOSIS: NORMAL
GROSS: NORMAL
Lab: NORMAL
MICROSCOPIC EXAM: NORMAL

## 2021-12-07 ENCOUNTER — PATIENT OUTREACH (OUTPATIENT)
Dept: ADMINISTRATIVE | Facility: OTHER | Age: 36
End: 2021-12-07
Payer: COMMERCIAL

## 2021-12-07 ENCOUNTER — OFFICE VISIT (OUTPATIENT)
Dept: DERMATOLOGY | Facility: CLINIC | Age: 36
End: 2021-12-07
Payer: COMMERCIAL

## 2021-12-07 DIAGNOSIS — Z48.02 VISIT FOR SUTURE REMOVAL: Primary | ICD-10-CM

## 2021-12-07 PROCEDURE — 99999 PR PBB SHADOW E&M-EST. PATIENT-LVL I: CPT | Mod: PBBFAC,,,

## 2021-12-07 PROCEDURE — 99212 OFFICE O/P EST SF 10 MIN: CPT | Mod: S$GLB,,, | Performed by: DERMATOLOGY

## 2021-12-07 PROCEDURE — 99999 PR PBB SHADOW E&M-EST. PATIENT-LVL I: ICD-10-PCS | Mod: PBBFAC,,,

## 2021-12-07 PROCEDURE — 99212 PR OFFICE/OUTPT VISIT, EST, LEVL II, 10-19 MIN: ICD-10-PCS | Mod: S$GLB,,, | Performed by: DERMATOLOGY

## 2022-02-14 ENCOUNTER — OFFICE VISIT (OUTPATIENT)
Dept: PSYCHIATRY | Facility: CLINIC | Age: 37
End: 2022-02-14
Payer: COMMERCIAL

## 2022-02-14 DIAGNOSIS — F12.90 MARIJUANA SMOKER, CONTINUOUS: ICD-10-CM

## 2022-02-14 DIAGNOSIS — F33.0 MILD EPISODE OF RECURRENT MAJOR DEPRESSIVE DISORDER: Primary | ICD-10-CM

## 2022-02-14 PROCEDURE — 1159F PR MEDICATION LIST DOCUMENTED IN MEDICAL RECORD: ICD-10-PCS | Mod: CPTII,95,, | Performed by: NURSE PRACTITIONER

## 2022-02-14 PROCEDURE — 99213 OFFICE O/P EST LOW 20 MIN: CPT | Mod: 95,,, | Performed by: NURSE PRACTITIONER

## 2022-02-14 PROCEDURE — 1159F MED LIST DOCD IN RCRD: CPT | Mod: CPTII,95,, | Performed by: NURSE PRACTITIONER

## 2022-02-14 PROCEDURE — 1160F RVW MEDS BY RX/DR IN RCRD: CPT | Mod: CPTII,95,, | Performed by: NURSE PRACTITIONER

## 2022-02-14 PROCEDURE — 1160F PR REVIEW ALL MEDS BY PRESCRIBER/CLIN PHARMACIST DOCUMENTED: ICD-10-PCS | Mod: CPTII,95,, | Performed by: NURSE PRACTITIONER

## 2022-02-14 PROCEDURE — 99213 PR OFFICE/OUTPT VISIT, EST, LEVL III, 20-29 MIN: ICD-10-PCS | Mod: 95,,, | Performed by: NURSE PRACTITIONER

## 2022-02-14 RX ORDER — DULOXETIN HYDROCHLORIDE 60 MG/1
60 CAPSULE, DELAYED RELEASE ORAL DAILY
Qty: 30 CAPSULE | Refills: 3 | Status: SHIPPED | OUTPATIENT
Start: 2022-02-14 | End: 2022-08-11
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
## 2022-03-14 DIAGNOSIS — G89.4 CHRONIC PAIN SYNDROME: Primary | ICD-10-CM

## 2022-03-14 RX ORDER — CELECOXIB 200 MG/1
200 CAPSULE ORAL DAILY
Qty: 90 CAPSULE | Refills: 1 | Status: SHIPPED | OUTPATIENT
Start: 2022-03-14 | End: 2022-09-20

## 2022-04-23 DIAGNOSIS — M54.9 DORSALGIA, UNSPECIFIED: Primary | ICD-10-CM

## 2022-04-23 NOTE — TELEPHONE ENCOUNTER
Care Due:                  Date            Visit Type   Department     Provider  --------------------------------------------------------------------------------                                EP -                              PRIMARY      [facility] INTERNAL  Maxwell Jewell  Last Visit: 07-      [facility] ([facility])   [facility]  Next Visit: None Scheduled  None         None Found                                                            Last  Test          Frequency    Reason                     Performed    Due Date  --------------------------------------------------------------------------------    Office Visit  12 months..  albuterol, valACYclovir..  07- 07-    Cr..........  12 months..  valACYclovir.............  Not Found    Overdue    Powered by [employer]. Reference number: [id number].   4/23/2022 12:36:24 PM CDT

## 2022-04-25 RX ORDER — VALACYCLOVIR HYDROCHLORIDE 500 MG/1
500 TABLET, FILM COATED ORAL 2 TIMES DAILY
Qty: 30 TABLET | Refills: 3 | Status: SHIPPED | OUTPATIENT
Start: 2022-04-25

## 2022-04-25 NOTE — TELEPHONE ENCOUNTER
valACYclovir (VALTREX) 500 MG tablet 500 mg, 2 times daily   EditCancel Reorder       Summary: Take 1 tablet (500 mg total) by mouth 2 (two) times daily., Starting Tue 2/15/2022, Normal     Dose, Route, Frequency: 500 mg, Oral, 2 times daily    Start: 2/15/2022    Ord/Sold: 2/15/2022 (O)      Report    Long-term:       Pharmacy: [id number] [address] Dose History         Patient Sig: Take 1 tablet (500 mg total) by mouth 2 (two) times daily.       Ordered on: 2/15/2022       Authorized by: CHELSIE DUNCAN       Dispense: 30 tablet       Refills: 3 ordered        Last office visit: 7/12/21

## 2022-09-20 ENCOUNTER — OFFICE VISIT (OUTPATIENT)
Dept: INTERNAL MEDICINE | Facility: CLINIC | Age: 37
End: 2022-09-20
Attending: FAMILY MEDICINE

## 2022-09-20 VITALS
DIASTOLIC BLOOD PRESSURE: 64 MMHG | BODY MASS INDEX: 23.91 KG/M2 | HEIGHT: 62 IN | WEIGHT: 129.94 LBS | HEART RATE: 60 BPM | SYSTOLIC BLOOD PRESSURE: 104 MMHG | OXYGEN SATURATION: 99 %

## 2022-09-20 DIAGNOSIS — R10.31 BILATERAL GROIN PAIN: ICD-10-CM

## 2022-09-20 DIAGNOSIS — R10.32 BILATERAL GROIN PAIN: ICD-10-CM

## 2022-09-20 DIAGNOSIS — Z01.419 WELL WOMAN EXAM: ICD-10-CM

## 2022-09-20 DIAGNOSIS — Z00.00 PREVENTATIVE HEALTH CARE: Primary | ICD-10-CM

## 2022-09-20 DIAGNOSIS — F34.1 DYSTHYMIA: ICD-10-CM

## 2022-09-20 PROCEDURE — 99999 PR PBB SHADOW E&M-EST. PATIENT-LVL IV: CPT | Mod: PBBFAC,,, | Performed by: FAMILY MEDICINE

## 2022-09-20 PROCEDURE — 99395 PREV VISIT EST AGE 18-39: CPT | Mod: S$PBB,,, | Performed by: FAMILY MEDICINE

## 2022-09-20 PROCEDURE — 99999 PR PBB SHADOW E&M-EST. PATIENT-LVL IV: ICD-10-PCS | Mod: PBBFAC,,, | Performed by: FAMILY MEDICINE

## 2022-09-20 PROCEDURE — 99395 PR PREVENTIVE VISIT,EST,18-39: ICD-10-PCS | Mod: S$PBB,,, | Performed by: FAMILY MEDICINE

## 2022-09-20 PROCEDURE — 99214 OFFICE O/P EST MOD 30 MIN: CPT | Mod: PBBFAC | Performed by: FAMILY MEDICINE

## 2022-09-20 RX ORDER — NABUMETONE 500 MG/1
500 TABLET, FILM COATED ORAL 2 TIMES DAILY
Qty: 60 TABLET | Refills: 3 | Status: SHIPPED | OUTPATIENT
Start: 2022-09-20 | End: 2022-10-20

## 2022-09-20 NOTE — PROGRESS NOTES
"CHIEF COMPLAINT:  Annual     HISTORY OF PRESENT ILLNESS: The patient is a perfectly healthy 37-year-old white female.  The patient has Several months of bilateral groin pain.  Pain is intermittent and lasts less than 2 minutes.  Over-the-counter medications and rest have not helped much.    Needs GYN exam.    Off cymbalta recently.    REVIEW OF SYSTEMS:  GENERAL: No fever, chills, fatigability or weight loss.  SKIN: No rashes, itching or changes in color or texture of skin.    HEAD: No headaches or recent head trauma.  EYES: Visual acuity fine. No photophobia, ocular pain or diplopia.  EARS: Denies ear pain, discharge or vertigo.  NOSE: No loss of smell, no epistaxis or postnasal drip.  MOUTH & THROAT: No hoarseness or change in voice. No excessive gum bleeding.  NODES: Denies swollen glands.  CHEST: Denies FIGUEROA, cyanosis, wheezing and sputum production.  CARDIOVASCULAR: Denies chest pain, PND, orthopnea or reduced exercise tolerance.  ABDOMEN: Appetite fine. No weight loss. Denies diarrhea, abdominal pain, hematemesis or blood in stool.  URINARY: No flank pain, dysuria or hematuria.  PERIPHERAL VASCULAR: No claudication or cyanosis.  MUSCULOSKELETAL:  neg  NEUROLOGIC: No history of seizures, paralysis, alteration of gait or coordination.    SOCIAL HISTORY: The patient does not smoke.  The patient consumes alcohol socially.  The patient is single.  She works at [email or web address]    PHYSICAL EXAMINATION:   Blood pressure 104/64, pulse 60, height 5' 2" (1.575 m), weight 59 kg (129 lb 15.4 oz), SpO2 99 %.    APPEARANCE: Well nourished, well developed, in no acute distress.    HEAD: Normocephalic, atraumatic.  EYES: PERRL. EOMI.  Conjunctivae without injection and  anicteric  NOSE: Mucosa pink. Airway clear.  MOUTH & THROAT: No tonsillar enlargement. No pharyngeal erythema or exudate. No stridor.  NECK: Supple.   NODES: No cervical, axillary or inguinal lymph node enlargement.  CHEST: Lungs clear to auscultation.  No " retractions are noted.  No rales or rhonchi are present.  CARDIOVASCULAR: Normal S1, S2. No rubs, murmurs or gallops.  ABDOMEN: Bowel sounds normal. Not distended. Soft. No tenderness or masses.  No ascites is noted.  MUSCULOSKELETAL:  There is no clubbing, cyanosis, or edema of the extremities x4.  There is full range of motion of the lumbar spine.  There is full range of motion of the extremities x4.  There is no deformity noted.    NEUROLOGIC:       Normal speech development.      Hearing normal.      Antalgic gait.      Motor and sensory exams grossly normal.  PSYCHIATRIC: Patient is alert and oriented x3.  Thought processes are all normal.  There is no homicidality.  There is no suicidality.  There is no evidence of psychosis.    LABORATORY/RADIOLOGY:   Chart reviewed.     ASSESSMENT:   Annual  Bilateral groin pain    PLAN:  We will follow-up blood work which we expect to be normal.    Relafen p.r.n.  Gyn referral  Return to clinic in one year.

## 2022-09-26 ENCOUNTER — OFFICE VISIT (OUTPATIENT)
Dept: PSYCHIATRY | Facility: CLINIC | Age: 37
End: 2022-09-26

## 2022-09-26 ENCOUNTER — LAB VISIT (OUTPATIENT)
Dept: LAB | Facility: OTHER | Age: 37
End: 2022-09-26
Attending: FAMILY MEDICINE

## 2022-09-26 DIAGNOSIS — Z00.00 PREVENTATIVE HEALTH CARE: ICD-10-CM

## 2022-09-26 DIAGNOSIS — F33.0 MILD EPISODE OF RECURRENT MAJOR DEPRESSIVE DISORDER: Primary | ICD-10-CM

## 2022-09-26 DIAGNOSIS — F12.90 MARIJUANA SMOKER, CONTINUOUS: ICD-10-CM

## 2022-09-26 LAB
ALBUMIN SERPL BCP-MCNC: 3.8 G/DL (ref 3.5–5.2)
ALP SERPL-CCNC: 42 U/L (ref 55–135)
ALT SERPL W/O P-5'-P-CCNC: 16 U/L (ref 10–44)
ANION GAP SERPL CALC-SCNC: 7 MMOL/L (ref 8–16)
AST SERPL-CCNC: 18 U/L (ref 10–40)
BILIRUB SERPL-MCNC: 0.8 MG/DL (ref 0.1–1)
BUN SERPL-MCNC: 8 MG/DL (ref 6–20)
CALCIUM SERPL-MCNC: 8.9 MG/DL (ref 8.7–10.5)
CHLORIDE SERPL-SCNC: 110 MMOL/L (ref 95–110)
CHOLEST SERPL-MCNC: 103 MG/DL (ref 120–199)
CHOLEST/HDLC SERPL: 1.7 {RATIO} (ref 2–5)
CO2 SERPL-SCNC: 24 MMOL/L (ref 23–29)
CREAT SERPL-MCNC: 0.7 MG/DL (ref 0.5–1.4)
EST. GFR  (NO RACE VARIABLE): >60 ML/MIN/1.73 M^2
ESTIMATED AVG GLUCOSE: 91 MG/DL (ref 68–131)
GLUCOSE SERPL-MCNC: 85 MG/DL (ref 70–110)
HBA1C MFR BLD: 4.8 % (ref 4–5.6)
HDLC SERPL-MCNC: 59 MG/DL (ref 40–75)
HDLC SERPL: 57.3 % (ref 20–50)
LDLC SERPL CALC-MCNC: 36.4 MG/DL (ref 63–159)
NONHDLC SERPL-MCNC: 44 MG/DL
POTASSIUM SERPL-SCNC: 4 MMOL/L (ref 3.5–5.1)
PROT SERPL-MCNC: 6.4 G/DL (ref 6–8.4)
SODIUM SERPL-SCNC: 141 MMOL/L (ref 136–145)
TRIGL SERPL-MCNC: 38 MG/DL (ref 30–150)
TSH SERPL DL<=0.005 MIU/L-ACNC: 1.11 UIU/ML (ref 0.4–4)

## 2022-09-26 PROCEDURE — 80053 COMPREHEN METABOLIC PANEL: CPT | Performed by: FAMILY MEDICINE

## 2022-09-26 PROCEDURE — 83036 HEMOGLOBIN GLYCOSYLATED A1C: CPT | Performed by: FAMILY MEDICINE

## 2022-09-26 PROCEDURE — 99213 PR OFFICE/OUTPT VISIT, EST, LEVL III, 20-29 MIN: ICD-10-PCS | Mod: 95,,, | Performed by: NURSE PRACTITIONER

## 2022-09-26 PROCEDURE — 99213 OFFICE O/P EST LOW 20 MIN: CPT | Mod: 95,,, | Performed by: NURSE PRACTITIONER

## 2022-09-26 PROCEDURE — 80061 LIPID PANEL: CPT | Performed by: FAMILY MEDICINE

## 2022-09-26 PROCEDURE — 84443 ASSAY THYROID STIM HORMONE: CPT | Performed by: FAMILY MEDICINE

## 2022-09-26 PROCEDURE — 36415 COLL VENOUS BLD VENIPUNCTURE: CPT | Performed by: FAMILY MEDICINE
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
## 2022-10-10 ENCOUNTER — PATIENT MESSAGE (OUTPATIENT)
Dept: INTERNAL MEDICINE | Facility: CLINIC | Age: 37
End: 2022-10-10

## 2022-11-01 ENCOUNTER — TELEPHONE (OUTPATIENT)
Dept: PAIN MEDICINE | Facility: CLINIC | Age: 37
End: 2022-11-01

## 2022-11-01 NOTE — TELEPHONE ENCOUNTER
----- Message from Poly Orozco sent at 11/1/2022  3:04 PM CDT -----  Regarding: Refill Request  Who Called: Nia from [facility]          New Prescription or Refill : Refill      RX Name and Strength: gabapentin (NEURONTIN) 300 MG capsule      30 day or 90 day RX: 90      Preferred Pharmacy: [id number] - [place] [address] AT [facility] [address]   Phone:  [phone]  Fax:  [phone]            Would the patient rather a call back or a response via [facility]? No

## 2022-11-08 ENCOUNTER — OFFICE VISIT (OUTPATIENT)
Dept: OBSTETRICS AND GYNECOLOGY | Facility: CLINIC | Age: 37
End: 2022-11-08
Attending: FAMILY MEDICINE

## 2022-11-08 VITALS
DIASTOLIC BLOOD PRESSURE: 64 MMHG | HEIGHT: 62 IN | BODY MASS INDEX: 23.93 KG/M2 | WEIGHT: 130.06 LBS | SYSTOLIC BLOOD PRESSURE: 98 MMHG

## 2022-11-08 DIAGNOSIS — Z01.419 ROUTINE GYNECOLOGICAL EXAMINATION: ICD-10-CM

## 2022-11-08 DIAGNOSIS — R10.2 PELVIC PAIN IN FEMALE: Primary | ICD-10-CM

## 2022-11-08 PROBLEM — Z63.9 RELATIONSHIP PROBLEMS: Status: RESOLVED | Noted: 2021-03-25 | Resolved: 2022-11-08

## 2022-11-08 PROCEDURE — 88175 CYTOPATH C/V AUTO FLUID REDO: CPT | Performed by: OBSTETRICS & GYNECOLOGY

## 2022-11-08 PROCEDURE — 87591 N.GONORRHOEAE DNA AMP PROB: CPT | Performed by: OBSTETRICS & GYNECOLOGY

## 2022-11-08 PROCEDURE — 99999 PR PBB SHADOW E&M-EST. PATIENT-LVL III: CPT | Mod: PBBFAC,,, | Performed by: OBSTETRICS & GYNECOLOGY

## 2022-11-08 PROCEDURE — 99213 OFFICE O/P EST LOW 20 MIN: CPT | Mod: PBBFAC | Performed by: OBSTETRICS & GYNECOLOGY

## 2022-11-08 PROCEDURE — 99385 PR PREVENTIVE VISIT,NEW,18-39: ICD-10-PCS | Mod: S$PBB,,, | Performed by: OBSTETRICS & GYNECOLOGY

## 2022-11-08 PROCEDURE — 99999 PR PBB SHADOW E&M-EST. PATIENT-LVL III: ICD-10-PCS | Mod: PBBFAC,,, | Performed by: OBSTETRICS & GYNECOLOGY

## 2022-11-08 PROCEDURE — 87491 CHLMYD TRACH DNA AMP PROBE: CPT | Performed by: OBSTETRICS & GYNECOLOGY

## 2022-11-08 PROCEDURE — 99385 PREV VISIT NEW AGE 18-39: CPT | Mod: S$PBB,,, | Performed by: OBSTETRICS & GYNECOLOGY

## 2022-11-08 PROCEDURE — 87086 URINE CULTURE/COLONY COUNT: CPT | Performed by: OBSTETRICS & GYNECOLOGY

## 2022-11-08 NOTE — PROGRESS NOTES
11/18/2022 U/S  Uterus:  Size: 7.1 x 3.3 x 4.4 cm  The parenchyma is homogeneous.  Endometrium is normal caliber measuring 0.5 cm.  Right ovary:  Size: 2.9 x 2 x 2.5 cm  Left ovary:  Size: 2.7 x 2 x 1.6 cm  Free Fluid:  None.  Impression:  Small volume of mildly complex fluid in the endometrial canal.  Please correlate with patient's cycle.  Endometrium measures normal caliber.  Remainder of the exam is normal.     11/08/22    Chlamydia, Amplified DNA Not Detected   N gonorrhoeae, amplified DNA Not Detected     Urine Culture, Routine No growth        PT HERE FOR ANNUAL.  For 2 Months. Location = B PELVIS. Quality =  SHARP SHOOTING ON/OFF NOW DULL ACHE.  - N / - V. - D / - C.  +/- Uti sx.  - Vag d/c.  - BRBPR.  -Dyschezia.  -Dyspareunia.  -Dysmenorrhea. Better with NOTHING. Worse with NOTHING.    ROS:  GENERAL: No fever, chills, fatigability or weight loss.  VULVAR: No pain, no lesions and no itching.  VAGINAL: No relaxation, no itching, no discharge, no abnormal bleeding and no lesions.  ABDOMEN: No abdominal pain. Denies nausea. Denies vomiting. No diarrhea. No constipation  BREAST: Denies pain. No lumps. No discharge.  URINARY: No incontinence, no nocturia, no frequency and no dysuria.  CARDIOVASCULAR: No chest pain. No shortness of breath. No leg cramps.  NEUROLOGICAL: No headaches. No vision changes.  The remainder of the review of systems was negative.    PE:  General Appearance: normal weight And Well developed. Well nourished. In no acute distress.  Vulva: Lesions: No.  Urethral Meatus: Normal size. Normal location. No lesions. No prolapse.  Urethra: No masses. No tenderness. No prolapse. No scarring.  Bladder: No masses. No tenderness.  Vagina: Mucosa NI:yes discharge no, atrophy no, cystocele no or rectocele no.  Cervix: Lesion: no  Stenotic: no Cervical motion tenderness: no  Uterus: Uterus size: 5 weeks. Support good. Uterus size: Normal  Adnexa: Masses: No Tenderness: No CDS Nodularity: No  Abdomen:  normal weight No masses. No tenderness.  Breasts: No bilateral masses. No bilateral discharge. No bilateral tenderness. No bilateral fibrocystic changes.  Neck: No thyroid enlargement. No thyroid masses.  Skin: Rashes: No      PROCEDURES:    PLAN:     DIAGNOSIS:  1. Pelvic pain in female    2. Routine gynecological examination        MEDICATIONS & ORDERS:  Orders Placed This Encounter    C. trachomatis/N. gonorrhoeae by AMP DNA    Urine culture    US Pelvis Comp with Transvag NON-OB (xpd    Liquid-Based Pap Smear, Screening       Patient was counseled today on the new ACS guidelines for cervical cytology screening as well as the current recommendations for breast cancer screening. She was counseled to follow up with her PCP for other routine health maintenance. Counseling session lasted approximately 10 minutes, and all her questions were answered.         FOLLOW-UP: With me AFTER U/S.    Vaughn Machado Jr, MD, FACOG

## 2022-11-09 LAB
BACTERIA UR CULT: NO GROWTH
C TRACH DNA SPEC QL NAA+PROBE: NOT DETECTED
N GONORRHOEA DNA SPEC QL NAA+PROBE: NOT DETECTED

## 2022-11-16 LAB
CLINICAL INFO: NORMAL
CYTO CVX: NORMAL
CYTOLOGIST CVX/VAG CYTO: NORMAL
CYTOLOGIST CVX/VAG CYTO: NORMAL
CYTOLOGY CMNT CVX/VAG CYTO-IMP: NORMAL
CYTOLOGY PAP THIN PREP EXPLANATION: NORMAL
DATE OF PREVIOUS PAP: NO
DATE PREVIOUS BX: NO
GEN CATEG CVX/VAG CYTO-IMP: NORMAL
LMP START DATE: NORMAL
MICROORGANISM CVX/VAG CYTO: NORMAL
PATHOLOGIST CVX/VAG CYTO: NORMAL
SERVICE CMNT-IMP: NORMAL
SPECIMEN SOURCE CVX/VAG CYTO: NORMAL
STAT OF ADQ CVX/VAG CYTO-IMP: NORMAL

## 2022-11-17 ENCOUNTER — HOSPITAL ENCOUNTER (OUTPATIENT)
Dept: RADIOLOGY | Facility: OTHER | Age: 37
Discharge: HOME | End: 2022-11-17
Attending: OBSTETRICS & GYNECOLOGY

## 2022-11-17 DIAGNOSIS — R10.2 PELVIC PAIN IN FEMALE: ICD-10-CM

## 2022-11-17 PROCEDURE — 76830 TRANSVAGINAL US NON-OB: CPT | Mod: TC

## 2022-11-17 PROCEDURE — 76830 TRANSVAGINAL US NON-OB: CPT | Mod: 26,,, | Performed by: RADIOLOGY

## 2022-11-17 PROCEDURE — 76830 US PELVIS COMP WITH TRANSVAG NON-OB (XPD): ICD-10-PCS | Mod: 26,,, | Performed by: RADIOLOGY

## 2022-11-17 PROCEDURE — 76856 US PELVIS COMP WITH TRANSVAG NON-OB (XPD): ICD-10-PCS | Mod: 26,,, | Performed by: RADIOLOGY

## 2022-11-17 PROCEDURE — 76856 US EXAM PELVIC COMPLETE: CPT | Mod: 26,,, | Performed by: RADIOLOGY

## 2024-07-29 ENCOUNTER — OFFICE VISIT (OUTPATIENT)
Dept: INTERNAL MEDICINE | Facility: CLINIC | Age: 39
End: 2024-07-29
Attending: FAMILY MEDICINE
Payer: COMMERCIAL

## 2024-07-29 VITALS
WEIGHT: 142 LBS | SYSTOLIC BLOOD PRESSURE: 106 MMHG | HEART RATE: 63 BPM | HEIGHT: 62 IN | OXYGEN SATURATION: 98 % | BODY MASS INDEX: 26.13 KG/M2 | DIASTOLIC BLOOD PRESSURE: 64 MMHG

## 2024-07-29 DIAGNOSIS — N92.0 MENORRHAGIA WITH REGULAR CYCLE: ICD-10-CM

## 2024-07-29 DIAGNOSIS — F34.1 DYSTHYMIA: ICD-10-CM

## 2024-07-29 DIAGNOSIS — Z00.00 PREVENTATIVE HEALTH CARE: Primary | ICD-10-CM

## 2024-07-29 DIAGNOSIS — B00.1 COLD SORE: ICD-10-CM

## 2024-07-29 PROCEDURE — 3074F SYST BP LT 130 MM HG: CPT | Mod: CPTII,S$GLB,, | Performed by: FAMILY MEDICINE

## 2024-07-29 PROCEDURE — 99395 PREV VISIT EST AGE 18-39: CPT | Mod: S$GLB,,, | Performed by: FAMILY MEDICINE

## 2024-07-29 PROCEDURE — 99999 PR PBB SHADOW E&M-EST. PATIENT-LVL III: CPT | Mod: PBBFAC,,, | Performed by: FAMILY MEDICINE

## 2024-07-29 PROCEDURE — 3008F BODY MASS INDEX DOCD: CPT | Mod: CPTII,S$GLB,, | Performed by: FAMILY MEDICINE

## 2024-07-29 PROCEDURE — 1159F MED LIST DOCD IN RCRD: CPT | Mod: CPTII,S$GLB,, | Performed by: FAMILY MEDICINE

## 2024-07-29 PROCEDURE — 3078F DIAST BP <80 MM HG: CPT | Mod: CPTII,S$GLB,, | Performed by: FAMILY MEDICINE

## 2024-07-29 PROCEDURE — 1160F RVW MEDS BY RX/DR IN RCRD: CPT | Mod: CPTII,S$GLB,, | Performed by: FAMILY MEDICINE

## 2024-07-29 RX ORDER — NAPROXEN 500 MG/1
500 TABLET ORAL 2 TIMES DAILY WITH MEALS
Qty: 60 TABLET | Refills: 2 | Status: SHIPPED | OUTPATIENT
Start: 2024-07-29

## 2024-07-29 RX ORDER — VALACYCLOVIR HYDROCHLORIDE 500 MG/1
500 TABLET, FILM COATED ORAL 2 TIMES DAILY
Qty: 30 TABLET | Refills: 3 | Status: SHIPPED | OUTPATIENT
Start: 2024-07-29

## 2024-07-29 NOTE — PROGRESS NOTES
"CHIEF COMPLAINT:  Annual     HISTORY OF PRESENT ILLNESS: The patient is a perfectly healthy 39-year-old white female.  The patient has menstrual pain and mild depression.    Needs GYN exam.    REVIEW OF SYSTEMS:  GENERAL: No fever, chills, fatigability or weight loss.  SKIN: No rashes, itching or changes in color or texture of skin.    HEAD: No headaches or recent head trauma.  EYES: Visual acuity fine. No photophobia, ocular pain or diplopia.  EARS: Denies ear pain, discharge or vertigo.  NOSE: No loss of smell, no epistaxis or postnasal drip.  MOUTH & THROAT: No hoarseness or change in voice. No excessive gum bleeding.  NODES: Denies swollen glands.  CHEST: Denies FIGUEROA, cyanosis, wheezing and sputum production.  CARDIOVASCULAR: Denies chest pain, PND, orthopnea or reduced exercise tolerance.  ABDOMEN: Appetite fine. No weight loss. Denies diarrhea, abdominal pain, hematemesis or blood in stool.  URINARY: No flank pain, dysuria or hematuria.  PERIPHERAL VASCULAR: No claudication or cyanosis.  MUSCULOSKELETAL:  neg  NEUROLOGIC: No history of seizures, paralysis, alteration of gait or coordination.    SOCIAL HISTORY: The patient does not smoke.  The patient consumes alcohol socially.  The patient is single.  She works at [employer]    PHYSICAL EXAMINATION:   Blood pressure 106/64, pulse 63, height 5' 2" (1.575 m), weight 64.4 kg (141 lb 15.6 oz), SpO2 98%.    APPEARANCE: Well nourished, well developed, in no acute distress.    HEAD: Normocephalic, atraumatic.  EYES: PERRL. EOMI.  Conjunctivae without injection and  anicteric  NOSE: Mucosa pink. Airway clear.  MOUTH & THROAT: No tonsillar enlargement. No pharyngeal erythema or exudate. No stridor.  NECK: Supple.   NODES: No cervical, axillary or inguinal lymph node enlargement.  CHEST: Lungs clear to auscultation.  No retractions are noted.  No rales or rhonchi are present.  CARDIOVASCULAR: Normal S1, S2. No rubs, murmurs or gallops.  ABDOMEN: Bowel sounds normal. " Not distended. Soft. No tenderness or masses.  No ascites is noted.  MUSCULOSKELETAL:  There is no clubbing, cyanosis, or edema of the extremities x4.  There is full range of motion of the lumbar spine.  There is full range of motion of the extremities x4.  There is no deformity noted.    NEUROLOGIC:       Normal speech development.      Hearing normal.      Antalgic gait.      Motor and sensory exams grossly normal.  PSYCHIATRIC: Patient is alert and oriented x3.  Thought processes are all normal.  There is no homicidality.  There is no suicidality.  There is no evidence of psychosis.    LABORATORY/RADIOLOGY:   Chart reviewed.     ASSESSMENT:   Annual  Dysmenorrhea  Mild depression    PLAN:  naprosyn p.r.n.  Gyn   Return to clinic in one year.

## 2024-11-27 RX ORDER — NAPROXEN 500 MG/1
500 TABLET ORAL 2 TIMES DAILY WITH MEALS
Qty: 60 TABLET | Refills: 2 | Status: SHIPPED | OUTPATIENT
Start: 2024-11-27

## 2024-11-27 NOTE — TELEPHONE ENCOUNTER
Care Due:                  Date            Visit Type   Department     Provider  --------------------------------------------------------------------------------                                MYCHART                              ANNUAL                              CHECKUP/PHY  [facility] INTERNAL  Maxwell Best  Last Visit: 07-      [facility] [place]  Next Visit: None Scheduled  None         None Found                                                            Last  Test          Frequency    Reason                     Performed    Due Date  --------------------------------------------------------------------------------    CBC.........  12 months..  naproxen, valACYclovir...  Not Found    Overdue    Cr..........  12 months..  naproxen, valACYclovir...  Not Found    Overdue    [employer] Embedded Care Due Messages. Reference number: [id number].   11/27/2024 3:44:17 AM CST

## 2025-01-02 DIAGNOSIS — B00.1 COLD SORE: ICD-10-CM

## 2025-01-02 NOTE — TELEPHONE ENCOUNTER
No care due was identified.  [facility] Embedded Care Due Messages. Reference number: [id number].   1/02/2025 3:46:18 AM CST

## 2025-01-03 RX ORDER — VALACYCLOVIR HYDROCHLORIDE 500 MG/1
500 TABLET, FILM COATED ORAL 2 TIMES DAILY
Qty: 30 TABLET | Refills: 0 | Status: SHIPPED | OUTPATIENT
Start: 2025-01-03

## 2025-01-03 NOTE — TELEPHONE ENCOUNTER
Refill Routing Note   Medication(s) are not appropriate for processing by [facility] for the following reason(s):        Required labs outdated    [facility] action(s):  Defer             Appointments  past 12m or future 3m with PCP    Date Provider   Last Visit   7/29/2024 Maxwell Fischer MD   Next Visit   Visit date not found Maxwell Fischer MD   ED visits in past 90 days: 0        Note composed:8:47 PM 01/02/2025

## 2025-06-30 ENCOUNTER — OFFICE VISIT (OUTPATIENT)
Dept: INTERNAL MEDICINE | Facility: CLINIC | Age: 40
End: 2025-06-30
Payer: COMMERCIAL

## 2025-06-30 VITALS
RESPIRATION RATE: 18 BRPM | SYSTOLIC BLOOD PRESSURE: 119 MMHG | HEIGHT: 62 IN | DIASTOLIC BLOOD PRESSURE: 63 MMHG | BODY MASS INDEX: 24.44 KG/M2 | HEART RATE: 60 BPM | OXYGEN SATURATION: 100 % | TEMPERATURE: 98 F | WEIGHT: 132.81 LBS

## 2025-06-30 DIAGNOSIS — L98.9 SKIN ABNORMALITY: Primary | ICD-10-CM

## 2025-06-30 DIAGNOSIS — F33.0 MILD EPISODE OF RECURRENT MAJOR DEPRESSIVE DISORDER: ICD-10-CM

## 2025-06-30 DIAGNOSIS — M79.672 LEFT FOOT PAIN: ICD-10-CM

## 2025-06-30 PROCEDURE — 99999 PR PBB SHADOW E&M-EST. PATIENT-LVL V: CPT | Mod: PBBFAC,,, | Performed by: NURSE PRACTITIONER

## 2025-06-30 RX ORDER — HYDROCORTISONE 1 %
CREAM (GRAM) TOPICAL 2 TIMES DAILY
Qty: 30 G | Refills: 1 | Status: SHIPPED | OUTPATIENT
Start: 2025-06-30 | End: 2025-07-14

## 2025-06-30 RX ORDER — DICLOFENAC SODIUM 10 MG/G
2 GEL TOPICAL 4 TIMES DAILY
Qty: 2 G | Refills: 1 | Status: SHIPPED | OUTPATIENT
Start: 2025-06-30 | End: 2025-07-14

## 2025-06-30 NOTE — PATIENT INSTRUCTIONS
Daily SPF 30+ sunscreen, hats, and protective clothing.  Rest, ice, compression, elevation (RICE).  Consider podiatry referral if no improvement to left foot.  Exercise 30 minutes daily, meditation, fresh air, journaling to increase mood.  Follow-up in 1 to 2 weeks or prn sooner.

## 2025-06-30 NOTE — PROGRESS NOTES
"Subjective     Patient ID: Nakia Craven is a 40 y.o. female.    Chief Complaint: Rash (On right ear, under right eye, dry skin, bump under eye) and Low-back Pain      Nakia craven is a 40-year-old female who presents with concerns about a rash on her right ear a bump on the right side of her nose, and recent changes in the coloration of her facial skin.  She reports frequent sun exposure due to her outdoor activities and is particularly concerned about the possibility of skin cancer, given her family history.  She also report left lateral foot pain that is aggravated by weight-bearing.  Rash  This is a recurrent problem. The current episode started more than 1 month ago. The problem has been gradually improving since onset. The affected locations include the right ear (right side of nose). The rash is characterized by scaling, itchiness, peeling, pain and redness ("scabby"). Associated with: sun. Pertinent negatives include no anorexia, congestion, cough, diarrhea, eye pain, facial edema, fatigue, fever, joint pain, nail changes, rhinorrhea, shortness of breath, sore throat or vomiting. Treatments tried: neosporin. The treatment provided moderate relief. There is no history of allergies, asthma, eczema or varicella.       Review of Systems   Constitutional:  Negative for fatigue and fever.   HENT:  Negative for nasal congestion, rhinorrhea and sore throat.    Eyes:  Negative for pain.   Respiratory:  Negative for cough and shortness of breath.    Gastrointestinal:  Negative for anorexia, diarrhea and vomiting.   Musculoskeletal:  Negative for joint pain.   Integumentary:  Positive for rash. Negative for nail changes.            Objective     Physical Exam  Vitals reviewed.   Constitutional:       Appearance: She is well-developed.   HENT:      Head: Normocephalic and atraumatic. Not macrocephalic and not microcephalic. No raccoon eyes, Harris's sign, abrasion, contusion, right periorbital erythema, left " periorbital erythema or laceration. Hair is normal.      Right Ear: No decreased hearing noted. No laceration, drainage, swelling or tenderness. No middle ear effusion. No foreign body. No mastoid tenderness. No hemotympanum. Tympanic membrane is not injected, scarred, perforated, erythematous, retracted or bulging. Tympanic membrane has normal mobility.      Left Ear: No decreased hearing noted. No laceration, drainage, swelling or tenderness.  No middle ear effusion. No foreign body. No mastoid tenderness. No hemotympanum. Tympanic membrane is not injected, scarred, perforated, erythematous, retracted or bulging. Tympanic membrane has normal mobility.      Ears:      Comments: + 3-4 mm flesh-colored papule noted on the right lateral nasal bridge, it is nontender and without ulceration or crusting.  Hyperpigmented patches are visible on her cheeks and nose bridge.    The right ear shows a mild erythematous, slightly crusting/peeling rash without signs of infection.     Nose: Nose normal. No nasal deformity, laceration or mucosal edema.      Mouth/Throat:      Pharynx: Uvula midline.   Eyes:      General: Lids are normal. No scleral icterus.     Conjunctiva/sclera: Conjunctivae normal.   Neck:      Thyroid: No thyroid mass or thyromegaly.      Trachea: Trachea normal.   Pulmonary:      Effort: Pulmonary effort is normal. No respiratory distress.      Breath sounds: Normal breath sounds. No stridor. No wheezing, rhonchi or rales.   Chest:      Chest wall: No tenderness.   Abdominal:      Palpations: Abdomen is soft.   Musculoskeletal:         General: Normal range of motion.      Cervical back: Normal range of motion and neck supple. No edema, erythema or rigidity. No spinous process tenderness or muscular tenderness. Normal range of motion.        Legs:       Comments: There is tenderness to lateral aspect of the left foot without visible swelling or deformity and with intact pulses and range of motion.  She denies  any trauma to the left foot. Positive range of motion noted to the left foot.   Lymphadenopathy:      Head:      Right side of head: No submental, submandibular, tonsillar, preauricular or posterior auricular adenopathy.      Left side of head: No submental, submandibular, tonsillar, preauricular, posterior auricular or occipital adenopathy.      Cervical: No cervical adenopathy.   Skin:     General: Skin is warm and dry.   Neurological:      Mental Status: She is alert and oriented to person, place, and time.   Psychiatric:         Behavior: Behavior normal.         Thought Content: Thought content normal.         Judgment: Judgment normal.            Assessment and Plan     1. Skin abnormality  -     Ambulatory referral/consult to Dermatology; Future; Expected date: 07/07/2025  -     hydrocortisone 1 % cream; Apply topically 2 (two) times daily. for 14 days  Dispense: 30 g; Refill: 1    2. Left foot pain  -     diclofenac sodium (VOLTAREN ARTHRITIS PAIN) 1 % Gel; Apply 2 g topically 4 (four) times daily. for 14 days  Dispense: 2 g; Refill: 1    3. Mild episode of recurrent major depressive disorder  -     Ambulatory referral/consult to Behavioral Health; Future; Expected date: 07/07/2025        Daily SPF 30+ sunscreen, hats, and protective clothing.  Rest, ice, compression, elevation (RICE).  Consider podiatry referral if no improvement to left foot.  Exercise 30 minutes daily, meditation, fresh air, journaling to increase mood.  Follow-up in 1 to 2 weeks or prn sooner.          Follow up in about 2 weeks (around 7/14/2025), or if symptoms worsen or fail to improve.

## 2025-07-01 ENCOUNTER — TELEPHONE (OUTPATIENT)
Dept: INTERNAL MEDICINE | Facility: CLINIC | Age: 40
End: 2025-07-01
Payer: COMMERCIAL

## 2025-07-01 NOTE — TELEPHONE ENCOUNTER
F-Code  Received: Today  Karin Almanzaime Staff  [facility],     We will require a  diagnosis code in order to process the referral. Please update the diagnosis code to lead in with a F-code.    Thank you,    Karin Chang, Behavioral Health

## 2025-07-15 ENCOUNTER — OFFICE VISIT (OUTPATIENT)
Dept: PSYCHIATRY | Facility: CLINIC | Age: 40
End: 2025-07-15
Payer: COMMERCIAL

## 2025-07-15 DIAGNOSIS — F33.0 MILD EPISODE OF RECURRENT MAJOR DEPRESSIVE DISORDER: Primary | ICD-10-CM

## 2025-07-15 DIAGNOSIS — F41.1 GAD (GENERALIZED ANXIETY DISORDER): ICD-10-CM

## 2025-07-15 PROCEDURE — 90791 PSYCH DIAGNOSTIC EVALUATION: CPT | Mod: 95,,, | Performed by: PSYCHOLOGIST
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
## 2025-07-16 ENCOUNTER — PATIENT MESSAGE (OUTPATIENT)
Dept: INTERNAL MEDICINE | Facility: CLINIC | Age: 40
End: 2025-07-16
Payer: COMMERCIAL

## 2025-07-28 ENCOUNTER — TELEPHONE (OUTPATIENT)
Dept: DERMATOLOGY | Facility: CLINIC | Age: 40
End: 2025-07-28
Payer: COMMERCIAL

## 2025-07-29 ENCOUNTER — OFFICE VISIT (OUTPATIENT)
Dept: DERMATOLOGY | Facility: CLINIC | Age: 40
End: 2025-07-29
Payer: COMMERCIAL

## 2025-07-29 DIAGNOSIS — Z12.83 SCREENING EXAM FOR SKIN CANCER: ICD-10-CM

## 2025-07-29 DIAGNOSIS — L98.9 SKIN ABNORMALITY: ICD-10-CM

## 2025-07-29 DIAGNOSIS — L56.8 ACTINIC CHEILITIS: ICD-10-CM

## 2025-07-29 DIAGNOSIS — L81.4 LENTIGINES: Primary | ICD-10-CM

## 2025-07-29 PROCEDURE — 1159F MED LIST DOCD IN RCRD: CPT | Mod: CPTII,S$GLB,, | Performed by: STUDENT IN AN ORGANIZED HEALTH CARE EDUCATION/TRAINING PROGRAM

## 2025-07-29 PROCEDURE — 99999 PR PBB SHADOW E&M-EST. PATIENT-LVL III: CPT | Mod: PBBFAC,,, | Performed by: STUDENT IN AN ORGANIZED HEALTH CARE EDUCATION/TRAINING PROGRAM

## 2025-07-29 PROCEDURE — 99203 OFFICE O/P NEW LOW 30 MIN: CPT | Mod: S$GLB,,, | Performed by: STUDENT IN AN ORGANIZED HEALTH CARE EDUCATION/TRAINING PROGRAM

## 2025-07-29 PROCEDURE — 1160F RVW MEDS BY RX/DR IN RCRD: CPT | Mod: CPTII,S$GLB,, | Performed by: STUDENT IN AN ORGANIZED HEALTH CARE EDUCATION/TRAINING PROGRAM

## 2025-07-29 NOTE — PROGRESS NOTES
Subjective:      Patient ID:  Nakia Jaime is a 40 y.o. female who presents for   Chief Complaint   Patient presents with    Skin Check     TBSE      Nakia Jaime is a 40 y.o. female who presents for: FBSE screening exam for skin cancer.    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 Negative for daily sunscreen use.   Hematologic/Lymphatic: Does not bruise/bleed easily.       Objective:   Physical Exam   Constitutional: She appears well-developed and well-nourished. No distress.   Neurological: She is alert and oriented to person, place, and time. She is not disoriented.   Psychiatric: She has a normal mood and affect.   Skin:   Areas Examined (abnormalities noted in diagram):   Scalp / Hair Palpated and Inspected  Head / Face Inspection Performed  Neck Inspection Performed  Chest / Axilla Inspection Performed  Abdomen Inspection Performed  Genitals / Buttocks / Groin Inspection Performed  Back Inspection Performed  RUE Inspected  LUE Inspection Performed  RLE Inspected  LLE Inspection Performed  Nails and Digits Inspection Performed                 Diagram Legend     Erythematous scaling macule/papule c/w actinic keratosis       Vascular papule c/w angioma      Pigmented verrucoid papule/plaque c/w seborrheic keratosis      Yellow umbilicated papule c/w sebaceous hyperplasia      Irregularly shaped tan macule c/w lentigo     1-2  mm smooth white papules consistent with Milia      Movable subcutaneous cyst with punctum c/w epidermal inclusion cyst      Subcutaneous movable cyst c/w pilar cyst      Firm pink to brown papule c/w dermatofibroma      Pedunculated fleshy papule(s) c/w skin tag(s)      Evenly pigmented macule c/w junctional nevus     Mildly variegated pigmented, slightly irregular-bordered macule c/w mildly atypical nevus      Flesh colored to evenly pigmented papule c/w intradermal nevus       Pink pearly papule/plaque c/w basal cell carcinoma      Erythematous hyperkeratotic cursted plaque c/w SCC      Surgical scar with no sign of skin cancer recurrence      Open and closed comedones      Inflammatory papules and pustules      Verrucoid papule consistent consistent with wart     Erythematous eczematous patches and plaques     Dystrophic onycholytic nail with subungual debris c/w onychomycosis     Umbilicated papule    Erythematous-base heme-crusted tan verrucoid plaque consistent with inflamed seborrheic keratosis     Erythematous Silvery Scaling Plaque c/w Psoriasis     See annotation      Assessment / Plan:        Lentigines  This is a benign hyperpigmented sun induced lesion. Recommend daily sun protection/avoidance and use of at least SPF 30, broad spectrum sunscreen (OTC drug) will reduce the number of new lesions. Treatment of these benign lesions are considered cosmetic.  Wear hat always    Actinic cheilitis  Lip SPF encouraged 30+  Contact office if tender bump develops can be a skin cancer  Vaseline recommended over other OTC lip prodcuts    Screening exam for skin cancer  Total body skin examination performed today including at least 12 points as noted in physical examination. No lesions suspicious for malignancy noted.    Recommend daily sun protection/avoidance, use of at least SPF 30, broad spectrum sunscreen (OTC drug), skin self examinations, and routine physician surveillance to optimize early detection    I  reviewed the ABCDE's of melanoma, ugly duckling sign and importance of monthly self-exams in mirror.     RTC 1 year, sooner prn

## 2025-07-31 ENCOUNTER — PATIENT MESSAGE (OUTPATIENT)
Dept: PSYCHIATRY | Facility: CLINIC | Age: 40
End: 2025-07-31
Payer: COMMERCIAL

## 2025-08-11 ENCOUNTER — PATIENT MESSAGE (OUTPATIENT)
Dept: PSYCHIATRY | Facility: CLINIC | Age: 40
End: 2025-08-11
Payer: COMMERCIAL

## 2025-08-18 ENCOUNTER — NURSE TRIAGE (OUTPATIENT)
Dept: ADMINISTRATIVE | Facility: CLINIC | Age: 40
End: 2025-08-18
Payer: COMMERCIAL

## 2025-08-25 ENCOUNTER — OFFICE VISIT (OUTPATIENT)
Dept: INTERNAL MEDICINE | Facility: CLINIC | Age: 40
End: 2025-08-25
Attending: FAMILY MEDICINE
Payer: COMMERCIAL

## 2025-08-25 VITALS
DIASTOLIC BLOOD PRESSURE: 60 MMHG | SYSTOLIC BLOOD PRESSURE: 100 MMHG | HEART RATE: 66 BPM | WEIGHT: 134.81 LBS | OXYGEN SATURATION: 100 % | HEIGHT: 62 IN | BODY MASS INDEX: 24.81 KG/M2

## 2025-08-25 DIAGNOSIS — M25.561 ACUTE PAIN OF RIGHT KNEE: Primary | ICD-10-CM

## 2025-08-25 PROCEDURE — 99999 PR PBB SHADOW E&M-EST. PATIENT-LVL IV: CPT | Mod: PBBFAC,,, | Performed by: FAMILY MEDICINE

## 2025-08-25 PROCEDURE — 3078F DIAST BP <80 MM HG: CPT | Mod: CPTII,S$GLB,, | Performed by: FAMILY MEDICINE

## 2025-08-25 PROCEDURE — 1160F RVW MEDS BY RX/DR IN RCRD: CPT | Mod: CPTII,S$GLB,, | Performed by: FAMILY MEDICINE

## 2025-08-25 PROCEDURE — G2211 COMPLEX E/M VISIT ADD ON: HCPCS | Mod: S$GLB,,, | Performed by: FAMILY MEDICINE

## 2025-08-25 PROCEDURE — 3008F BODY MASS INDEX DOCD: CPT | Mod: CPTII,S$GLB,, | Performed by: FAMILY MEDICINE

## 2025-08-25 PROCEDURE — 1159F MED LIST DOCD IN RCRD: CPT | Mod: CPTII,S$GLB,, | Performed by: FAMILY MEDICINE

## 2025-08-25 PROCEDURE — 99214 OFFICE O/P EST MOD 30 MIN: CPT | Mod: S$GLB,,, | Performed by: FAMILY MEDICINE

## 2025-08-25 PROCEDURE — 3074F SYST BP LT 130 MM HG: CPT | Mod: CPTII,S$GLB,, | Performed by: FAMILY MEDICINE

## 2025-08-25 RX ORDER — NABUMETONE 500 MG/1
500 TABLET, FILM COATED ORAL 2 TIMES DAILY PRN
Qty: 60 TABLET | Refills: 3 | Status: SHIPPED | OUTPATIENT
Start: 2025-08-25

## 2025-09-04 ENCOUNTER — TELEPHONE (OUTPATIENT)
Dept: INTERNAL MEDICINE | Facility: CLINIC | Age: 40
End: 2025-09-04
Payer: COMMERCIAL